# Patient Record
Sex: FEMALE | Race: WHITE | NOT HISPANIC OR LATINO | Employment: UNEMPLOYED | URBAN - METROPOLITAN AREA
[De-identification: names, ages, dates, MRNs, and addresses within clinical notes are randomized per-mention and may not be internally consistent; named-entity substitution may affect disease eponyms.]

---

## 2022-11-12 ENCOUNTER — HOSPITAL ENCOUNTER (EMERGENCY)
Facility: CLINIC | Age: 27
Discharge: HOME | End: 2022-11-12
Admitting: PHYSICIAN ASSISTANT

## 2022-11-12 ENCOUNTER — APPOINTMENT (OUTPATIENT)
Dept: RADIOLOGY | Facility: CLINIC | Age: 27
End: 2022-11-12
Attending: PHYSICIAN ASSISTANT

## 2022-11-12 VITALS
HEART RATE: 96 BPM | BODY MASS INDEX: 22.08 KG/M2 | RESPIRATION RATE: 18 BRPM | SYSTOLIC BLOOD PRESSURE: 112 MMHG | DIASTOLIC BLOOD PRESSURE: 63 MMHG | OXYGEN SATURATION: 100 % | HEIGHT: 62 IN | TEMPERATURE: 98.2 F | WEIGHT: 120 LBS

## 2022-11-12 DIAGNOSIS — M70.21 OLECRANON BURSITIS OF RIGHT ELBOW: ICD-10-CM

## 2022-11-12 PROCEDURE — 73080 X-RAY EXAM OF ELBOW: CPT | Mod: RT

## 2022-11-12 PROCEDURE — 99283 EMERGENCY DEPT VISIT LOW MDM: CPT

## 2022-11-12 ASSESSMENT — ACTIVITIES OF DAILY LIVING (ADL): ADLS_ACUITY_SCORE: 35

## 2022-11-12 NOTE — DISCHARGE INSTRUCTIONS
At this point time I recommend conservative therapy in the form of ibuprofen routinely, ice and Ace wrap.  Please follow-up with your primary care in a couple days if there is continued symptoms.  If you develop fever, worsening pain or expanding redness please return to the ED.

## 2022-11-12 NOTE — ED TRIAGE NOTES
Patient woke this morning with right elbow pain, currently 6/10.      Triage Assessment     Row Name 11/12/22 2744       Triage Assessment (Adult)    Airway WDL WDL       Respiratory WDL    Respiratory WDL WDL       Skin Circulation/Temperature WDL    Skin Circulation/Temperature WDL X       Cardiac WDL    Cardiac WDL WDL       Peripheral/Neurovascular WDL    Peripheral Neurovascular WDL WDL       Cognitive/Neuro/Behavioral WDL    Cognitive/Neuro/Behavioral WDL WDL

## 2022-11-12 NOTE — ED PROVIDER NOTES
EMERGENCY DEPARTMENT ENCOUNTER      NAME: Oxana Harper  AGE: 27 year old female  YOB: 1995  MRN: [id number]  EVALUATION DATE & TIME: 11/12/2022  3:26 PM    PCP: No primary care provider on file.    ED PROVIDER: Pedro Reilly PA-C      Chief Complaint   Patient presents with     Elbow Injury         FINAL IMPRESSION:  1. Olecranon bursitis of right elbow          MEDICAL DECISION MAKING:    Pertinent Labs & Imaging studies reviewed. (See chart for details)  27 year old female presents to the Emergency Department for evaluation of right elbow pain.    After obtaining history present illness, reviewing vitals and examining the patient plan to screen with an x-ray as the patient is unsure whether or not there was actual trauma.  Clinically this appears consistent with an olecranon bursitis.  No fever or chills, no warmth, no redness streaking up or further down the arm.  My suspicion for infection is low.  If x-rays negative clinical plan to treat conservatively with NSAIDs and Ace wrap's and ice is recommended.    X-rays did return normal, no obvious fracture.  Again we will move forward with conservative management stress in signs and symptoms for which to return such as increasing pain, redness that is spreading or development of fever.  Ace wrap was applied prior to discharge.    ED COURSE    I met with the patient, obtained history, performed an initial exam, and discussed options and plan for diagnostics and treatment here in the ED.    At the conclusion of the encounter I discussed the results of all of the tests and the disposition. The questions were answered. The patient or family acknowledged understanding and was agreeable with the care plan.     MEDICATIONS GIVEN IN THE EMERGENCY:  Medications - No data to display    NEW PRESCRIPTIONS STARTED AT TODAY'S ER VISIT  New Prescriptions    No medications on file         "    =================================================================    HPI    Patient information was obtained from: Patient  Oxana Harper is a 27 year old female who presents to this ED for evaluation of evaluation of right elbow swelling and discomfort.  Patient reports that she first noticed the pain and swelling this morning when she awoke.  She does report that her and her family were doing some ranging in her house moving furniture and she may have bumped it in doing so but does not recall any type of severe trauma.  She did take some Tylenol over-the-counter earlier today and it has helped with the pain.  She denies any fever or chills.  She is right-hand dominant.  There is no warmth to touch over the area.  She is never had this problem before and denies previous history of gout.      REVIEW OF SYSTEMS   Review of Systems   All other systems reviewed and are negative.         PAST MEDICAL HISTORY:  No past medical history on file.    PAST SURGICAL HISTORY:  No past surgical history on file.      CURRENT MEDICATIONS:    No current facility-administered medications for this encounter.  No current outpatient medications on file.      ALLERGIES:  Allergies   Allergen Reactions     Codeine Hives     Penicillins Hives       FAMILY HISTORY:  No family history on file.    SOCIAL HISTORY:        VITALS:  Patient Vitals for the past 24 hrs:   BP Temp Temp src Pulse Resp SpO2 Height Weight   11/12/22 1523 112/63 98.2  F (36.8  C) Oral 96 18 100 % 1.575 m (5' 2\") 54.4 kg (120 lb)       PHYSICAL EXAM    Physical Exam  Vitals and nursing note reviewed.   Constitutional:       General: She is not in acute distress.     Appearance: She is normal weight. She is not ill-appearing or toxic-appearing.   HENT:      Head: Normocephalic.      Right Ear: External ear normal.      Left Ear: External ear normal.   Eyes:      Conjunctiva/sclera: Conjunctivae normal.   Cardiovascular:      Rate and Rhythm: Normal rate.      " Pulses: Normal pulses.   Pulmonary:      Effort: Pulmonary effort is normal. No respiratory distress.   Musculoskeletal:      Comments: Examination of the right elbow does reveal a small area of clinically apparent of a clown nose/olecranon bursitis right elbow.  There is no palpable joint effusion of the right elbow.  Patient can supinate and pronate as well as flex and extend without significant discomfort.  There is no warmth.  No erythematous streaking distal or proximal to the elbow.  Remainder musculoskeletal exam is benign.  The bursa itself is not fluctuant.   Skin:     General: Skin is warm and dry.   Neurological:      General: No focal deficit present.      Mental Status: She is alert. Mental status is at baseline.      Sensory: No sensory deficit.      Motor: No weakness.          LAB:  All pertinent labs reviewed and interpreted.  Results for orders placed or performed during the hospital encounter of 11/12/22   Elbow XR, G/E 3 views, right    Impression    IMPRESSION: Mild soft tissue swelling over the olecranon, cannot exclude bursitis or hematoma. No acute fracture or malalignment. There is normal joint spacing. No elbow joint effusion.       RADIOLOGY:  Reviewed all pertinent imaging. Please see official radiology report.  Elbow XR, G/E 3 views, right   Final Result   IMPRESSION: Mild soft tissue swelling over the olecranon, cannot exclude bursitis or hematoma. No acute fracture or malalignment. There is normal joint spacing. No elbow joint effusion.              Pedro Reilly PA-C  Emergency Medicine  [facility]     Pedro Reilly PA-C  11/12/22 6895

## 2022-11-13 ENCOUNTER — APPOINTMENT (OUTPATIENT)
Dept: RADIOLOGY | Facility: HOSPITAL | Age: 27
DRG: 872 | End: 2022-11-13
Attending: INTERNAL MEDICINE

## 2022-11-13 ENCOUNTER — HOSPITAL ENCOUNTER (INPATIENT)
Facility: HOSPITAL | Age: 27
LOS: 3 days | Discharge: HOME | DRG: 872 | End: 2022-11-16
Attending: FAMILY MEDICINE | Admitting: INTERNAL MEDICINE

## 2022-11-13 DIAGNOSIS — M71.10 BURSITIS DUE TO BACTERIAL INFECTION: ICD-10-CM

## 2022-11-13 DIAGNOSIS — B96.89 BURSITIS DUE TO BACTERIAL INFECTION: ICD-10-CM

## 2022-11-13 DIAGNOSIS — M71.121 SEPTIC BURSITIS OF ELBOW, RIGHT: ICD-10-CM

## 2022-11-13 DIAGNOSIS — B37.0 ORAL THRUSH: Primary | ICD-10-CM

## 2022-11-13 DIAGNOSIS — F17.200 TOBACCO USE DISORDER: ICD-10-CM

## 2022-11-13 PROBLEM — A41.9 SEPSIS (H): Status: ACTIVE | Noted: 2022-11-13

## 2022-11-13 LAB
ANION GAP SERPL CALCULATED.3IONS-SCNC: 13 MMOL/L (ref 7–15)
BASOPHILS # BLD AUTO: 0.1 10E3/UL (ref 0–0.2)
BASOPHILS NFR BLD AUTO: 0 %
BUN SERPL-MCNC: 5.7 MG/DL (ref 6–20)
CALCIUM SERPL-MCNC: 9.1 MG/DL (ref 8.6–10)
CHLORIDE SERPL-SCNC: 101 MMOL/L (ref 98–107)
CREAT SERPL-MCNC: 0.59 MG/DL (ref 0.51–0.95)
CRP SERPL-MCNC: 49.7 MG/L
DEPRECATED HCO3 PLAS-SCNC: 21 MMOL/L (ref 22–29)
EOSINOPHIL # BLD AUTO: 0 10E3/UL (ref 0–0.7)
EOSINOPHIL NFR BLD AUTO: 0 %
ERYTHROCYTE [DISTWIDTH] IN BLOOD BY AUTOMATED COUNT: 17.2 % (ref 10–15)
GFR SERPL CREATININE-BSD FRML MDRD: >90 ML/MIN/1.73M2
GLUCOSE SERPL-MCNC: 96 MG/DL (ref 70–99)
HCT VFR BLD AUTO: 35.5 % (ref 35–47)
HGB BLD-MCNC: 12.5 G/DL (ref 11.7–15.7)
IMM GRANULOCYTES # BLD: 0.1 10E3/UL
IMM GRANULOCYTES NFR BLD: 1 %
LACTATE SERPL-SCNC: 1.3 MMOL/L (ref 0.7–2)
LYMPHOCYTES # BLD AUTO: 0.9 10E3/UL (ref 0.8–5.3)
LYMPHOCYTES NFR BLD AUTO: 5 %
MAGNESIUM SERPL-MCNC: 1.5 MG/DL (ref 1.7–2.3)
MAGNESIUM SERPL-MCNC: 2.2 MG/DL (ref 1.7–2.3)
MCH RBC QN AUTO: 35.3 PG (ref 26.5–33)
MCHC RBC AUTO-ENTMCNC: 35.2 G/DL (ref 31.5–36.5)
MCV RBC AUTO: 100 FL (ref 78–100)
MONOCYTES # BLD AUTO: 0.9 10E3/UL (ref 0–1.3)
MONOCYTES NFR BLD AUTO: 5 %
NEUTROPHILS # BLD AUTO: 14.9 10E3/UL (ref 1.6–8.3)
NEUTROPHILS NFR BLD AUTO: 89 %
NRBC # BLD AUTO: 0 10E3/UL
NRBC BLD AUTO-RTO: 0 /100
PLATELET # BLD AUTO: 169 10E3/UL (ref 150–450)
POTASSIUM SERPL-SCNC: 3 MMOL/L (ref 3.4–5.3)
POTASSIUM SERPL-SCNC: 3.4 MMOL/L (ref 3.4–5.3)
POTASSIUM SERPL-SCNC: 3.6 MMOL/L (ref 3.4–5.3)
RBC # BLD AUTO: 3.54 10E6/UL (ref 3.8–5.2)
SARS-COV-2 RNA RESP QL NAA+PROBE: NEGATIVE
SODIUM SERPL-SCNC: 135 MMOL/L (ref 136–145)
WBC # BLD AUTO: 16.8 10E3/UL (ref 4–11)

## 2022-11-13 PROCEDURE — 250N000011 HC RX IP 250 OP 636: Performed by: INTERNAL MEDICINE

## 2022-11-13 PROCEDURE — 83735 ASSAY OF MAGNESIUM: CPT | Performed by: INTERNAL MEDICINE

## 2022-11-13 PROCEDURE — 250N000013 HC RX MED GY IP 250 OP 250 PS 637: Performed by: INTERNAL MEDICINE

## 2022-11-13 PROCEDURE — 84132 ASSAY OF SERUM POTASSIUM: CPT | Performed by: INTERNAL MEDICINE

## 2022-11-13 PROCEDURE — 258N000003 HC RX IP 258 OP 636: Performed by: INTERNAL MEDICINE

## 2022-11-13 PROCEDURE — 36415 COLL VENOUS BLD VENIPUNCTURE: CPT | Performed by: INTERNAL MEDICINE

## 2022-11-13 PROCEDURE — 120N000001 HC R&B MED SURG/OB

## 2022-11-13 PROCEDURE — 83605 ASSAY OF LACTIC ACID: CPT | Performed by: FAMILY MEDICINE

## 2022-11-13 PROCEDURE — 87040 BLOOD CULTURE FOR BACTERIA: CPT | Performed by: FAMILY MEDICINE

## 2022-11-13 PROCEDURE — 99223 1ST HOSP IP/OBS HIGH 75: CPT | Mod: GC | Performed by: INTERNAL MEDICINE

## 2022-11-13 PROCEDURE — 99285 EMERGENCY DEPT VISIT HI MDM: CPT | Mod: 25

## 2022-11-13 PROCEDURE — 82310 ASSAY OF CALCIUM: CPT | Performed by: FAMILY MEDICINE

## 2022-11-13 PROCEDURE — 96374 THER/PROPH/DIAG INJ IV PUSH: CPT

## 2022-11-13 PROCEDURE — U0005 INFEC AGEN DETEC AMPLI PROBE: HCPCS | Performed by: FAMILY MEDICINE

## 2022-11-13 PROCEDURE — 86140 C-REACTIVE PROTEIN: CPT | Performed by: INTERNAL MEDICINE

## 2022-11-13 PROCEDURE — 85025 COMPLETE CBC W/AUTO DIFF WBC: CPT | Performed by: FAMILY MEDICINE

## 2022-11-13 PROCEDURE — 71046 X-RAY EXAM CHEST 2 VIEWS: CPT

## 2022-11-13 PROCEDURE — 36415 COLL VENOUS BLD VENIPUNCTURE: CPT | Performed by: FAMILY MEDICINE

## 2022-11-13 PROCEDURE — C9803 HOPD COVID-19 SPEC COLLECT: HCPCS

## 2022-11-13 PROCEDURE — 250N000011 HC RX IP 250 OP 636: Performed by: FAMILY MEDICINE

## 2022-11-13 RX ORDER — POTASSIUM CHLORIDE 1500 MG/1
40 TABLET, EXTENDED RELEASE ORAL ONCE
Status: COMPLETED | OUTPATIENT
Start: 2022-11-13 | End: 2022-11-13

## 2022-11-13 RX ORDER — OXYCODONE HYDROCHLORIDE 5 MG/1
5 TABLET ORAL EVERY 4 HOURS PRN
Status: DISCONTINUED | OUTPATIENT
Start: 2022-11-13 | End: 2022-11-16 | Stop reason: HOSPADM

## 2022-11-13 RX ORDER — ONDANSETRON 2 MG/ML
4 INJECTION INTRAMUSCULAR; INTRAVENOUS EVERY 6 HOURS PRN
Status: DISCONTINUED | OUTPATIENT
Start: 2022-11-13 | End: 2022-11-16 | Stop reason: HOSPADM

## 2022-11-13 RX ORDER — ACETAMINOPHEN 650 MG/1
650 SUPPOSITORY RECTAL EVERY 6 HOURS PRN
Status: DISCONTINUED | OUTPATIENT
Start: 2022-11-13 | End: 2022-11-16 | Stop reason: HOSPADM

## 2022-11-13 RX ORDER — CALCIUM CARBONATE 500 MG/1
1000 TABLET, CHEWABLE ORAL 4 TIMES DAILY PRN
Status: DISCONTINUED | OUTPATIENT
Start: 2022-11-13 | End: 2022-11-16 | Stop reason: HOSPADM

## 2022-11-13 RX ORDER — VANCOMYCIN HYDROCHLORIDE 1 G/200ML
1000 INJECTION, SOLUTION INTRAVENOUS ONCE
Status: COMPLETED | OUTPATIENT
Start: 2022-11-13 | End: 2022-11-13

## 2022-11-13 RX ORDER — OMEPRAZOLE 20 MG/1
40 TABLET, DELAYED RELEASE ORAL DAILY
Status: DISCONTINUED | OUTPATIENT
Start: 2022-11-13 | End: 2022-11-13 | Stop reason: CLARIF

## 2022-11-13 RX ORDER — MAGNESIUM SULFATE 4 G/50ML
4 INJECTION INTRAVENOUS ONCE
Status: COMPLETED | OUTPATIENT
Start: 2022-11-13 | End: 2022-11-13

## 2022-11-13 RX ORDER — LIDOCAINE 40 MG/G
CREAM TOPICAL
Status: DISCONTINUED | OUTPATIENT
Start: 2022-11-13 | End: 2022-11-16 | Stop reason: HOSPADM

## 2022-11-13 RX ORDER — CALCIUM CARBONATE 500 MG/1
2 TABLET, CHEWABLE ORAL 4 TIMES DAILY PRN
COMMUNITY

## 2022-11-13 RX ORDER — HYDROXYZINE HYDROCHLORIDE 25 MG/1
25 TABLET, FILM COATED ORAL EVERY 6 HOURS PRN
Status: DISCONTINUED | OUTPATIENT
Start: 2022-11-13 | End: 2022-11-16 | Stop reason: HOSPADM

## 2022-11-13 RX ORDER — NALOXONE HYDROCHLORIDE 0.4 MG/ML
0.4 INJECTION, SOLUTION INTRAMUSCULAR; INTRAVENOUS; SUBCUTANEOUS
Status: DISCONTINUED | OUTPATIENT
Start: 2022-11-13 | End: 2022-11-16 | Stop reason: HOSPADM

## 2022-11-13 RX ORDER — HYDROXYZINE HYDROCHLORIDE 25 MG/1
50 TABLET, FILM COATED ORAL EVERY 6 HOURS PRN
Status: DISCONTINUED | OUTPATIENT
Start: 2022-11-13 | End: 2022-11-16 | Stop reason: HOSPADM

## 2022-11-13 RX ORDER — PANTOPRAZOLE SODIUM 40 MG/1
40 TABLET, DELAYED RELEASE ORAL
Status: DISCONTINUED | OUTPATIENT
Start: 2022-11-13 | End: 2022-11-16 | Stop reason: HOSPADM

## 2022-11-13 RX ORDER — ONDANSETRON 4 MG/1
4 TABLET, ORALLY DISINTEGRATING ORAL EVERY 6 HOURS PRN
Status: DISCONTINUED | OUTPATIENT
Start: 2022-11-13 | End: 2022-11-16 | Stop reason: HOSPADM

## 2022-11-13 RX ORDER — VANCOMYCIN HYDROCHLORIDE 1 G/200ML
1000 INJECTION, SOLUTION INTRAVENOUS EVERY 12 HOURS
Status: DISCONTINUED | OUTPATIENT
Start: 2022-11-13 | End: 2022-11-15

## 2022-11-13 RX ORDER — OMEPRAZOLE 20 MG/1
40 TABLET, DELAYED RELEASE ORAL DAILY
COMMUNITY

## 2022-11-13 RX ORDER — NICOTINE 21 MG/24HR
1 PATCH, TRANSDERMAL 24 HOURS TRANSDERMAL DAILY
Status: DISCONTINUED | OUTPATIENT
Start: 2022-11-13 | End: 2022-11-16 | Stop reason: HOSPADM

## 2022-11-13 RX ORDER — ACETAMINOPHEN 325 MG/1
650 TABLET ORAL EVERY 6 HOURS PRN
Status: DISCONTINUED | OUTPATIENT
Start: 2022-11-13 | End: 2022-11-16 | Stop reason: HOSPADM

## 2022-11-13 RX ORDER — CEFEPIME HYDROCHLORIDE 1 G/1
1 INJECTION, POWDER, FOR SOLUTION INTRAMUSCULAR; INTRAVENOUS ONCE
Status: DISCONTINUED | OUTPATIENT
Start: 2022-11-13 | End: 2022-11-13

## 2022-11-13 RX ORDER — NALOXONE HYDROCHLORIDE 0.4 MG/ML
0.2 INJECTION, SOLUTION INTRAMUSCULAR; INTRAVENOUS; SUBCUTANEOUS
Status: DISCONTINUED | OUTPATIENT
Start: 2022-11-13 | End: 2022-11-16 | Stop reason: HOSPADM

## 2022-11-13 RX ORDER — IPRATROPIUM BROMIDE AND ALBUTEROL SULFATE 2.5; .5 MG/3ML; MG/3ML
3 SOLUTION RESPIRATORY (INHALATION) EVERY 4 HOURS PRN
Status: DISCONTINUED | OUTPATIENT
Start: 2022-11-13 | End: 2022-11-16 | Stop reason: HOSPADM

## 2022-11-13 RX ORDER — NAPROXEN SODIUM 220 MG
220 TABLET ORAL 2 TIMES DAILY PRN
COMMUNITY

## 2022-11-13 RX ORDER — ACETAMINOPHEN 325 MG/1
325-650 TABLET ORAL EVERY 6 HOURS PRN
COMMUNITY

## 2022-11-13 RX ORDER — NAPROXEN 250 MG/1
250 TABLET ORAL 2 TIMES DAILY PRN
Status: DISCONTINUED | OUTPATIENT
Start: 2022-11-13 | End: 2022-11-16 | Stop reason: HOSPADM

## 2022-11-13 RX ORDER — POTASSIUM CHLORIDE 1500 MG/1
20 TABLET, EXTENDED RELEASE ORAL ONCE
Status: COMPLETED | OUTPATIENT
Start: 2022-11-13 | End: 2022-11-13

## 2022-11-13 RX ORDER — GUAIFENESIN 600 MG/1
600 TABLET, EXTENDED RELEASE ORAL 2 TIMES DAILY
Status: DISCONTINUED | OUTPATIENT
Start: 2022-11-13 | End: 2022-11-16 | Stop reason: HOSPADM

## 2022-11-13 RX ADMIN — GUAIFENESIN 600 MG: 600 TABLET, EXTENDED RELEASE ORAL at 07:36

## 2022-11-13 RX ADMIN — NAPROXEN 250 MG: 250 TABLET ORAL at 22:45

## 2022-11-13 RX ADMIN — VANCOMYCIN HYDROCHLORIDE 1000 MG: 1 INJECTION, SOLUTION INTRAVENOUS at 06:19

## 2022-11-13 RX ADMIN — ACETAMINOPHEN 650 MG: 325 TABLET, FILM COATED ORAL at 12:29

## 2022-11-13 RX ADMIN — CEFEPIME HYDROCHLORIDE 2 G: 2 INJECTION, POWDER, FOR SOLUTION INTRAVENOUS at 07:52

## 2022-11-13 RX ADMIN — PANTOPRAZOLE SODIUM 40 MG: 40 TABLET, DELAYED RELEASE ORAL at 12:19

## 2022-11-13 RX ADMIN — OXYCODONE HYDROCHLORIDE 5 MG: 5 TABLET ORAL at 15:59

## 2022-11-13 RX ADMIN — POTASSIUM CHLORIDE 40 MEQ: 1500 TABLET, EXTENDED RELEASE ORAL at 15:51

## 2022-11-13 RX ADMIN — CEFEPIME HYDROCHLORIDE 2 G: 2 INJECTION, POWDER, FOR SOLUTION INTRAVENOUS at 22:38

## 2022-11-13 RX ADMIN — MAGNESIUM SULFATE HEPTAHYDRATE 4 G: 80 INJECTION, SOLUTION INTRAVENOUS at 07:39

## 2022-11-13 RX ADMIN — SODIUM CHLORIDE 1566 ML: 9 INJECTION, SOLUTION INTRAVENOUS at 06:46

## 2022-11-13 RX ADMIN — ONDANSETRON 4 MG: 4 TABLET, ORALLY DISINTEGRATING ORAL at 18:09

## 2022-11-13 RX ADMIN — NICOTINE 1 PATCH: 14 PATCH, EXTENDED RELEASE TRANSDERMAL at 07:52

## 2022-11-13 RX ADMIN — VANCOMYCIN HYDROCHLORIDE 1000 MG: 1 INJECTION, SOLUTION INTRAVENOUS at 17:52

## 2022-11-13 RX ADMIN — CEFEPIME HYDROCHLORIDE 2 G: 2 INJECTION, POWDER, FOR SOLUTION INTRAVENOUS at 14:59

## 2022-11-13 RX ADMIN — POTASSIUM CHLORIDE 20 MEQ: 1500 TABLET, EXTENDED RELEASE ORAL at 09:51

## 2022-11-13 RX ADMIN — GUAIFENESIN 600 MG: 600 TABLET, EXTENDED RELEASE ORAL at 20:09

## 2022-11-13 RX ADMIN — POTASSIUM CHLORIDE 40 MEQ: 1500 TABLET, EXTENDED RELEASE ORAL at 07:36

## 2022-11-13 ASSESSMENT — ACTIVITIES OF DAILY LIVING (ADL)
ADLS_ACUITY_SCORE: 35

## 2022-11-13 ASSESSMENT — ENCOUNTER SYMPTOMS: FEVER: 1

## 2022-11-13 NOTE — ED NOTES
"[facility] ED Handoff Report    ED Chief Complaint: Right elbow pain and swelling    ED Diagnosis:  (M71.121) Septic bursitis of elbow, right  Comment:   Plan: Admit       PMH:  History reviewed. No pertinent past medical history.     Code Status:  No Order     Falls Risk: No Band: Not applicable    Current Living Situation/Residence: lives with a significant other     Elimination Status: Continent: Yes     Activity Level: Independent    Patients Preferred Language:  English     Needed: No    Vital Signs:  /60   Pulse 89   Temp 98.1  F (36.7  C) (Oral)   Resp 16   Ht 1.575 m (5' 2\")   Wt 52.2 kg (115 lb)   LMP 10/31/2022   SpO2 99%   BMI 21.03 kg/m       Cardiac Rhythm: n/a    Pain Score: 5/10    Is the Patient Confused:  No    Last Food or Drink: 11/13/22 at 0630    Focused Assessment:  Patient c/o right elbow pain and swelling that started yesterday. No injury noted. Was also seen yesterday at [facility].     Tests Performed: Done: Labs    Treatments Provided:  Currently has Vancomycin and NS bolus running    Family Dynamics/Concerns: No    Family Updated On Visitor Policy: Yes    Plan of Care Communicated to Family: Yes    Who Was Updated about Plan of Care: significant other at bedside    Belongings Checklist Done and Signed by Patient: Yes    Medications sent with patient: n/a    Covid: asymptomatic , pending    Additional Information: Patient has penicillin allergy, pharmacist recommends closely monitoring patient when administering Cefepime for cross reaction. Patient also reports that she is socially anxious and needs to have her partner with her.    RN: Marge Keita RN 11/13/2022 6:35 AM       "

## 2022-11-13 NOTE — ED NOTES
Received call from pharmacist Marium. She reports that patient should be watch closely with Cefepime administration due to potential for cross reactivity. Patient has history of penicillin reaction (hives).

## 2022-11-13 NOTE — ED PROVIDER NOTES
EMERGENCY DEPARTMENT ENCOUNTER      NAME: Oxana Harper  AGE: 27 year old female  YOB: 1995  MRN: [id number]  EVALUATION DATE & TIME: No admission date for patient encounter.    PCP: System, Provider Not In    ED PROVIDER: Danial Yip M.D.    Chief Complaint   Patient presents with     Right arm pain and swelling       FINAL IMPRESSION:  1. Septic bursitis of elbow, right        ED COURSE & MEDICAL DECISION MAKING:    Pertinent Labs & Imaging studies personally reviewed and interpreted by me. (See chart for details)  5:37 AM Patient seen and examined, prior emergency department records reviewed.  Differential diagnosis includes but not limited to sprain, sprain, gout, dislocation, septic arthritis, arthritis.  Patient presents with swelling of the right elbow that started yesterday, was seen at an outside facility and diagnosed with olecranon bursitis.  Since then has developed increased redness and pain as well as fever at home to 1 and 1.7.  On exam here, patient is afebrile.  The right olecranon bursa has surrounding erythema which is well demarcated.  No pain with passive movement of the joint to suggest crystal arthropathy or septic arthritis.  No history of trauma negative x-ray earlier today making fracture or dislocation unlikely.  Symptoms are most consistent with septic bursitis.  Given reported fever as well as tachycardia, labs are ordered along with vancomycin IV.  We did discuss possible aspiration of this bursa, however I do not feel a large fluid collection to be drained and concern for high likelihood of not entering the bursa.  Will defer this for now.  6:17 AM Spoke with Dr. Villagomez, [facility].  Recommends broad-spectrum antibiotics, initially Zosyn and vancomycin but with penicillin allergy will use cefepime.  Recommends against drainage and agrees with plan for admission.  6:32 AM3 Care discussed with Dr. Landon, hospitalist who accepts the patient for admission.     6:54 AM refuses to have compression dressing put on the elbow.  Discussed that this will help with swelling, declines.    At the conclusion of the encounter I discussed the results of all of the tests and the disposition. The questions were answered. The patient or family acknowledged understanding and was agreeable with the care plan.     Medical Decision Making    Supplemental history from: Family Member    External Record(s) Reviewed: Outside ED Record    Differential Diagnosis: See MDM charting for differential considered.     I performed an independent interpretation of the: N/A    Discussed with radiology regarding test interpretation: N/A    Discussion of management with another provider: See ED Course    The following testing was considered but ultimately not selected: None    I considered prescription management with: Antibiotic and Symptomatic Management    The patient's care impacted: None    Consideration of Admission/Observation: N/A - Patient admitted    Care significantly affected by Social Determinants of Health including: N/A    PROCEDURES:   Procedures    MEDICATIONS GIVEN IN THE EMERGENCY:  Medications   vancomycin (VANCOCIN) 1000 mg in dextrose 5% 200 mL PREMIX (1,000 mg Intravenous New Bag 11/13/22 0619)   ceFEPIme (MAXIPIME) 1g vial to attach to  ml bag for ADULTS or NS 50 ml bag for PEDS (has no administration in time range)       NEW PRESCRIPTIONS STARTED AT TODAY'S ER VISIT  New Prescriptions    No medications on file       =================================================================    HPI    Patient information was obtained from: patient       Oxana Harper is a 27 year old female with a pertinent history of right olecranon bursitis, who presents to this ED via walk in for evaluation of increased right elbow swelling and redness.    Patient recently diagnosed yesterday at [facility] ED with right olecranon bursitis to which she was discharged home. This morning, she  "woke up with increased right elbow swelling and redness. There is pain to her right elbow if she straightens her right arm. She also had a measured fever of 101.3. She has tried icing the area and taking Ibuprofen, Naproxen and Tylenol to which did not provide any relief. Last dose of Tylenol was at 1:30AM. No recent injury to the right elbow.  She denies any risk of pregnancy. No other medical complaints at this time.     Chart Review:  11/12/22: [facility] ED: Patient presented with right elbow swelling and discomfort. Elbow XR G/E 3 views right showed Mild soft tissue swelling over the olecranon, cannot exclude bursitis or hematoma. No acute fracture or malalignment. There is normal joint spacing. No elbow joint effusion. Patient diagnosed with olecranon bursitis to her right elbow. Ace wrap was applied prior to discharge and advised to treat with NSAIDS at home.         REVIEW OF SYSTEMS   Review of Systems   Constitutional: Positive for fever.   Musculoskeletal:        Positive right elbow pain, swelling and redness   All other systems reviewed and are negative.     All other systems reviewed and negative    PAST MEDICAL HISTORY:  History reviewed. No pertinent past medical history.    PAST SURGICAL HISTORY:  History reviewed. No pertinent surgical history.    CURRENT MEDICATIONS:    Current Facility-Administered Medications   Medication     ceFEPIme (MAXIPIME) 1g vial to attach to  ml bag for ADULTS or NS 50 ml bag for PEDS     vancomycin (VANCOCIN) 1000 mg in dextrose 5% 200 mL PREMIX     No current outpatient medications on file.       ALLERGIES:  Allergies   Allergen Reactions     Codeine Hives     Penicillins Hives       FAMILY HISTORY:  History reviewed. No pertinent family history.    SOCIAL HISTORY:   Social History     Socioeconomic History     Marital status: Single       VITALS:  BP 97/66   Pulse 88   Temp 98.1  F (36.7  C) (Oral)   Resp 16   Ht 1.575 m (5' 2\")   Wt 52.2 kg (115 lb)   LMP " 10/31/2022   SpO2 99%   BMI 21.03 kg/m      PHYSICAL EXAM:  Physical Exam  Vitals and nursing note reviewed.   Constitutional:       Appearance: Normal appearance.   HENT:      Head: Normocephalic and atraumatic.      Right Ear: External ear normal.      Left Ear: External ear normal.      Nose: Nose normal.      Mouth/Throat:      Mouth: Mucous membranes are moist.   Eyes:      Extraocular Movements: Extraocular movements intact.      Conjunctiva/sclera: Conjunctivae normal.      Pupils: Pupils are equal, round, and reactive to light.   Cardiovascular:      Rate and Rhythm: Regular rhythm. Tachycardia present.   Pulmonary:      Effort: Pulmonary effort is normal.      Breath sounds: Normal breath sounds. No wheezing or rales.   Abdominal:      General: Abdomen is flat. There is no distension.      Palpations: Abdomen is soft.      Tenderness: There is no abdominal tenderness. There is no guarding.   Musculoskeletal:         General: Normal range of motion.      Cervical back: Normal range of motion and neck supple.      Right lower leg: No edema.      Left lower leg: No edema.      Comments: Well demarcated swelling, warmth and redness of right olecranon   Lymphadenopathy:      Cervical: No cervical adenopathy.   Skin:     General: Skin is warm and dry.   Neurological:      General: No focal deficit present.      Mental Status: She is alert and oriented to person, place, and time. Mental status is at baseline.      Comments: No gross focal neurologic deficits   Psychiatric:         Mood and Affect: Mood normal.         Behavior: Behavior normal.         Thought Content: Thought content normal.          LAB:  All pertinent labs reviewed and interpreted.  Results for orders placed or performed during the hospital encounter of 11/13/22   Basic metabolic panel   Result Value Ref Range    Sodium 135 (L) 136 - 145 mmol/L    Potassium 3.0 (L) 3.4 - 5.3 mmol/L    Chloride 101 98 - 107 mmol/L    Carbon Dioxide (CO2) 21  (L) 22 - 29 mmol/L    Anion Gap 13 7 - 15 mmol/L    Urea Nitrogen 5.7 (L) 6.0 - 20.0 mg/dL    Creatinine 0.59 0.51 - 0.95 mg/dL    Calcium 9.1 8.6 - 10.0 mg/dL    Glucose 96 70 - 99 mg/dL    GFR Estimate >90 >60 mL/min/1.73m2   Lactic acid whole blood   Result Value Ref Range    Lactic Acid 1.3 0.7 - 2.0 mmol/L   CBC with platelets and differential   Result Value Ref Range    WBC Count 16.8 (H) 4.0 - 11.0 10e3/uL    RBC Count 3.54 (L) 3.80 - 5.20 10e6/uL    Hemoglobin 12.5 11.7 - 15.7 g/dL    Hematocrit 35.5 35.0 - 47.0 %     78 - 100 fL    MCH 35.3 (H) 26.5 - 33.0 pg    MCHC 35.2 31.5 - 36.5 g/dL    RDW 17.2 (H) 10.0 - 15.0 %    Platelet Count 169 150 - 450 10e3/uL    % Neutrophils 89 %    % Lymphocytes 5 %    % Monocytes 5 %    % Eosinophils 0 %    % Basophils 0 %    % Immature Granulocytes 1 %    NRBCs per 100 WBC 0 <1 /100    Absolute Neutrophils 14.9 (H) 1.6 - 8.3 10e3/uL    Absolute Lymphocytes 0.9 0.8 - 5.3 10e3/uL    Absolute Monocytes 0.9 0.0 - 1.3 10e3/uL    Absolute Eosinophils 0.0 0.0 - 0.7 10e3/uL    Absolute Basophils 0.1 0.0 - 0.2 10e3/uL    Absolute Immature Granulocytes 0.1 <=0.4 10e3/uL    Absolute NRBCs 0.0 10e3/uL       RADIOLOGY:  Reviewed all pertinent imaging. Please see official radiology report.  No orders to display         I, Heidi Irby, am serving as a scribe to document services personally performed by Dr. Yip based on my observation and the provider's statements to me. I, Danial Yip MD attest that Heidi Irby is acting in a scribe capacity, has observed my performance of the services and has documented them in accordance with my direction.    Danial Yip M.D.  Emergency Medicine  [facility] EMERGENCY DEPARTMENT  [facility] [facility] [address]  [phone]  Dept: [phone]     Danial Yip MD  11/13/22 0655

## 2022-11-13 NOTE — PHARMACY-VANCOMYCIN DOSING SERVICE
Pharmacy Vancomycin Initial Note  Date of Service 2022  Patient's  1995  27 year old, female    Indication: olecranon bursitits    Current estimated CrCl = Estimated Creatinine Clearance: 115.9 mL/min (based on SCr of 0.59 mg/dL).    Creatinine for last 3 days  2022:  5:54 AM Creatinine 0.59 mg/dL    Recent Vancomycin Level(s) for last 3 days  No results found for requested labs within last 72 hours.      Vancomycin IV Administrations (past 72 hours)                   vancomycin (VANCOCIN) 1000 mg in dextrose 5% 200 mL PREMIX (mg) 1,000 mg New Bag 22 0619                Nephrotoxins and other renal medications (From now, onward)    Start     Dose/Rate Route Frequency Ordered Stop    22 1800  vancomycin (VANCOCIN) 1000 mg in dextrose 5% 200 mL PREMIX         1,000 mg  200 mL/hr over 1 Hours Intravenous EVERY 12 HOURS 22 0917            Contrast Orders - past 72 hours (72h ago, onward)    None          [employer] Prediction of Planned Initial Vancomycin Regimen  Regimen: 1000 mg IV every 12 hours.  Start time: 10:09 on 2022  Exposure target: AUC24 (range)400-600 mg/L.hr   AUC24,ss: 455 mg/L.hr  Probability of AUC24 > 400: 63 %  Ctrough,ss: 12.3 mg/L  Probability of Ctrough,ss > 20: 18 %  Probability of nephrotoxicity (Lodise DENISE ): 8 %          Plan:  1. Start vancomycin  1000 mg IV q12h.   2. Vancomycin monitoring method: AUC  3. Vancomycin therapeutic monitoring goal: 400-600 mg*h/L  4. Pharmacy will check vancomycin levels as appropriate in 1-3 Days.    5. Serum creatinine levels will be ordered daily x 3 days, then reassess ongoing plan.      Fiona Araya [facility]

## 2022-11-13 NOTE — H&P
"[facility]    History and Physical - Hospitalist Service       Date of Admission:  11/13/2022    Assessment & Plan      Oxana Harper is a 27 year old female previously healthy admitted on 11/13/2022.     #Right elbow olecranon bursitis.  No trauma or repetitive use.  No recent febrile illness.  X-ray without elbow joint effusion.  #Sepsis, T-max one 101.3/at home, WBC 16.8, BP 90/60, lactate 1.3.  CRP 50.  -30 mL/KG NS bolus  -Vancomycin/cefepime  -Orthopedics evaluation; ED provider contacted on-call physician, recommended IV ABx and no immediate surgical plans  -Symptomatic treatment with analgesics, antipyretics.  Noted allergy to opiates-reportedly hives to codeine and cough syrup.    Anxious mood, due to acute illness and hospitalization.  Reportedly \"I am doing well when being alone\".  No known history of panic attacks.  -As needed Vistaril    Hypokalemia at 3.0.  -Check magnesium  -Magnesium and potassium replacement per protocols    Acute bronchitis without bronchospasm.  Viral vs bacterial.  Already on antibiotics as above.  COVID-19 negative.  -Guaifenesin twice daily  -As needed albuterol  -Smoking cessation  -Obtain chest x-ray    Tobacco use disorder.  Patient smokes cigarettes.  She was counseled on smoking cessation.    Nicotine replacement-patch.       Diet: Combination Diet Regular Diet Adult    DVT Prophylaxis: Low Risk/Ambulatory with no VTE prophylaxis indicated  Zepeda Catheter: Not present  Central Lines: None  Cardiac Monitoring: None  Code Status: Full Code      Clinically Significant Risk Factors Present on Admission     # Hypokalemia: Lowest K = 3 mmol/L (Ref range: 3.4-5.3) in last 2 days, will replace as needed  # Hyponatremia: Lowest Na = 135 mmol/L (Ref range: 136-145) in last 2 days, will monitor as appropriate                    Disposition Plan      Expected Discharge Date: 11/15/2022                The patient's care was discussed with the Bedside " Nurse, Patient and Patient's friend at bedside in ER.    Adelaida Landon MD  Hospitalist Service  [facility]  Securely message with the [employer] Web Console (learn more here)  Text page via [employer] Paging/Directory   _____________________________________________________________________    Chief Complaint   Right elbow redness, edema, pain, fever, cough    History is obtained from the patient, electronic health record and emergency department physician    History of Present Illness   Oxana Harper is a 27 year old female previously healthy not on any prescription or over-the-counter medications, who recently on October 1 relocated from [place], presents to ED with above-mentioned complaints.  Onset of right elbow swelling and redness in 11/12 at 11 AM, upon awakening.  Throughout the day redness and pain worsened, prompting evaluation at [facility] ED.  He therapy and allergies were recommended the patient was discharged home.  He has symptoms worsened overnight thus she came to our ER.  She is reporting fever 101.3, increased pain in right elbow, decreased range of motion.  Patient denies preceding trauma or infection.  She has dry cough, attributing to a viral infection going around household of 8 people.  She smokes.  Patient denies right arm weakness, tingling, numbness.  No red streaking proximally or distally from olecranon.  She denies chest pain, cardiac limitations, abdominal pain, nausea, dysuria.  No previous musculoskeletal illnesses.    Review of Systems    The 10 point Review of Systems is negative other than noted in the HPI.    Past Medical History    Tobacco use disorder.    Past Surgical History   No previous surgeries.    Social History   Patient relocated to [place] from [place] on October 1, 2022.  She smokes cigarettes.  Denies recreational drugs or daily alcohol use.       Family History   Family history reviewed and not pertinent to chief complaint.    Prior to  Admission Medications   None     Allergies   Allergies   Allergen Reactions     Codeine Hives     Penicillins Hives       Physical Exam   Vital Signs: Temp: 98.1  F (36.7  C) Temp src: Oral BP: 110/60 Pulse: 89   Resp: 16 SpO2: 99 % O2 Device: None (Room air)    Weight: 115 lbs 0 oz  General: Alert and oriented x 3. Not in obvious distress.  HEENT: NC, AT. Neck- supple, No JVP elevation, lymphadenopathy or thyromegaly. Trachea-central.  Chest: Clear to auscultation bilaterally.  Heart: S1S2 regular. No M/R/G.  Abdomen: Soft. NT, ND. No organomegaly. Bowel sounds- active.  Back: No spine tenderness. No CVA tenderness.  Extremities: No leg swelling. Peripheral pulses 2+ bilaterally.  Neuro: Cranial nerves 1-12 grossly normal. No focal neurological deficit    Data   Data reviewed today: I reviewed all medications, new labs and imaging results over the last 24 hours. I personally reviewed.    Recent Labs   Lab 11/13/22  0554   WBC 16.8*   HGB 12.5         *   POTASSIUM 3.0*   CHLORIDE 101   CO2 21*   BUN 5.7*   CR 0.59   ANIONGAP 13   PRETTY 9.1   GLC 96     16.8 (H)    \    12.5    /    169   N 89    L N/A    135 (L)    101    5.7 (L) /   ------------------------------------ 96   ALT N/A   AST N/A   AP N/A   ALB N/A   Ca 9.1  3.0 (L)    21 (L)    0.59 \    % RETIC N/A    LDH N/A  Troponin N/A    BNP N/A    CK N/A  INR N/A   PTT N/A    D-dimer N/A    Fibrinogen N/A    Antithrombin N/A  Ferritin N/A  CRP 49.70 (H)    IL-6 N/A  Recent Results (from the past 24 hour(s))   Elbow XR, G/E 3 views, right    Narrative    EXAM: XR ELBOW RIGHT G/E 3 VIEWS  LOCATION: [facility]  DATE/TIME: 11/12/2022 4:19 PM    INDICATION: possible trauma to right elbow, pain  COMPARISON: None.      Impression    IMPRESSION: Mild soft tissue swelling over the olecranon, cannot exclude bursitis or hematoma. No acute fracture or malalignment. There is normal joint spacing. No elbow joint effusion.

## 2022-11-13 NOTE — CONSULTS
ORTHOPEDIC CONSULTATION    CHIEF COMPLAINT: Right elbow pain and redness      HISTORY OF PRESENT ILLNESS:  The patient is seen in orthopedic consultation at the request of Edmund Oh MD.      The patient is a 27 year old female with 1-day history of right elbow pain and redness.  She noticed this yesterday when she was rearranging her furniture with her family.  She might of bumped the elbow but did not have any traumatic wounds.  She was seen at [facility] emergency department yesterday where x-rays were obtained and unremarkable.  She had progressive pain and redness throughout the day yesterday and presented to the emergency department at [facility] this morning.  She endorsed a fever of 101 at home.  She denies any numbness or tingling.  No history of gout.  No other signs of other systemic illness.    Since admission, patient has been afebrile.    PAST MEDICAL HISTORY:   History reviewed. No pertinent past medical history.     ALLERGIES:   Codeine and Penicillins    MEDICATIONS ON ADMISSION:  Medications were reviewed.  They do include:   Medications Prior to Admission   Medication Sig Dispense Refill Last Dose     acetaminophen (TYLENOL) 325 MG tablet Take 325-650 mg by mouth every 6 hours as needed   11/12/2022     aspirin-acetaminophen-caffeine (EXCEDRIN MIGRAINE) 250-250-65 MG tablet Take 1 tablet by mouth daily as needed for headaches   Past Week     calcium carbonate (TUMS) 500 MG chewable tablet Take 2 chew tab by mouth 4 times daily as needed for heartburn   11/12/2022     dextromethorphan (TUSSIN COUGH) 15 MG/5ML syrup Take 10 mLs by mouth 4 times daily as needed for cough   11/12/2022     naproxen sodium (ANAPROX) 220 MG tablet Take 220 mg by mouth 2 times daily as needed   11/12/2022     omeprazole (PRILOSEC OTC) 20 MG EC tablet Take 40 mg by mouth daily   Past Week       SOCIAL HISTORY:   Social History     Socioeconomic History     Marital status: Single     Spouse name: Not on file      Number of children: Not on file     Years of education: Not on file     Highest education level: Not on file   Occupational History     Not on file   Tobacco Use     Smoking status: Not on file     Smokeless tobacco: Not on file   Substance and Sexual Activity     Alcohol use: Not on file     Drug use: Not on file     Sexual activity: Not on file   Other Topics Concern     Not on file   Social History Narrative     Not on file     Social Determinants of Health     Financial Resource Strain: Not on file   Food Insecurity: Not on file   Transportation Needs: Not on file   Physical Activity: Not on file   Stress: Not on file   Social Connections: Not on file   Intimate Partner Violence: Not on file   Housing Stability: Not on file         FAMILY HISTORY:  History reviewed. No pertinent family history.    REVIEW OF SYSTEMS:   See Admission History and Physical     PHYSICAL EXAMINATION:  Temp: 98.2  F (36.8  C) Temp src: Oral BP: 102/55 Pulse: 100   Resp: 20 SpO2: 98 % O2 Device: None (Room air)     General: On examination, the patient is AOx3.  Resting comfortably.     Erythema over the posterior aspect of the elbow.  Nontender along joint line.  Patient tolerates active and passive elbow range of motion with pain at the end range of motion.  Range of motion , limited by swelling and pain.  No fluctuance or evidence of abscess.  No evidence of large olecranon bursa.  Fires EPL, FPL, intrinsics.  Sensation intact in median, radial, ulnar nerve distribution.  Fingers warm and well-perfused.      RADIOGRAPHIC EVALUATION:   3 views right elbow obtained 11/12/2022 are reviewed and are unremarkable for any fracture or dislocation.  Evidence of posterior elbow soft tissue swelling.        LABORATORY DATA:     Recent Labs   Lab Test 11/13/22  0554   HGB 12.5        CRP 49.7  WBC 16.8    IMPRESSION:   27-year-old female with right posterior elbow cellulitis versus small olecranon bursitis.       PLAN:  -Broad-spectrum IV antibiotics   -No surgical intervention at this time  -Monitor clinical exam and labs    Malena Villagomez MD  10:44 AM  11/13/2022

## 2022-11-13 NOTE — ED NOTES
Cefepime abx infusing, pt monitored for cross-allergy/sensitivity pt tolerating it. pt medicated per mag  and K+ protocol- see labs for recheck.

## 2022-11-13 NOTE — CONSULTS
Care Management Initial Consult    General Information  Assessment completed with: Patient,         Primary Care Provider verified and updated as needed:     Readmission within the last 30 days:           Advance Care Planning:            Communication Assessment  Patient's communication style: spoken language (English or Bilingual)             Cognitive  Cognitive/Neuro/Behavioral: WDL                      Living Environment:   People in home: friend(s)     Current living Arrangements:        Able to return to prior arrangements: yes       Family/Social Support:  Care provided by:    Provides care for:    Marital Status: Single             Description of Support System:           Current Resources:   Patient receiving home care services: No     Community Resources:    Equipment currently used at home:    Supplies currently used at home:      Employment/Financial:  Employment Status: other (see comments) (planned on starting a job in [employer] at [employer] 11/14)        Financial Concerns:     Referral to Financial Worker: Yes       Lifestyle & Psychosocial Needs:  Social Determinants of Health     Tobacco Use: Not on file   Alcohol Use: Not on file   Financial Resource Strain: Not on file   Food Insecurity: Not on file   Transportation Needs: Not on file   Physical Activity: Not on file   Stress: Not on file   Social Connections: Not on file   Intimate Partner Violence: Not on file   Depression: Not on file   Housing Stability: Not on file       Functional Status:  Prior to admission patient needed assistance:              Mental Health Status:  Mental Health Status: No Current Concerns       Chemical Dependency Status:                Values/Beliefs:  Spiritual, Cultural Beliefs, Gnosticism Practices, Values that affect care:                 Additional Information:  Patient is alert.oriented and independent with ADLs. She expects to discharge home with a friend to transport. Referral made to Financial SW re: no  insurance. Patient planned on starting a [employer] job with [employer] 11/14.    MAGGI Castrejon

## 2022-11-13 NOTE — ED NOTES
MD wanting pressure dressing to pts elbow.  Pt not wanting to be in pain and not wanting any pressure on the elbow.   MD aware

## 2022-11-13 NOTE — Clinical Note
Oxana Harper was seen and treated in our emergency department on 11/13/2022.    Patient admitted to hospital on 11/13/2022     Sincerely,     [facility] Emergency Department

## 2022-11-13 NOTE — ED TRIAGE NOTES
Pt came in to the ER with significant other for c/o right elbow swelling increasing. The redness and swelling to her elbow started yesterday at 1100, pt went to [facility], xray was done, no fracture. Pt was diagnosed with bursitis and discharged. This morning when she woke up the swelling has increased up her arm. Last took naproxen at 0130 and tylenol 2300.      Triage Assessment     Row Name 11/13/22 0523       Respiratory WDL    Respiratory WDL WDL

## 2022-11-13 NOTE — PROGRESS NOTES
Patient seen by Dr. Landon with this morning.  Chart reviewed.  Chest x-ray does not show any infiltrate.  Continue as needed DuoNebs for bronchitis.  Continue IV vancomycin.  Appreciate Ortho input.  Order CRP for next a.m. order oxycodone as needed for severe pain

## 2022-11-13 NOTE — PHARMACY-ADMISSION MEDICATION HISTORY
Pharmacy Note - Admission Medication History    Pertinent Provider Information: none     ______________________________________________________________________    Prior To Admission (PTA) med list completed and updated in EMR.       PTA Med List   Medication Sig Last Dose     acetaminophen (TYLENOL) 325 MG tablet Take 325-650 mg by mouth every 6 hours as needed 11/12/2022     aspirin-acetaminophen-caffeine (EXCEDRIN MIGRAINE) 250-250-65 MG tablet Take 1 tablet by mouth daily as needed for headaches Past Week     calcium carbonate (TUMS) 500 MG chewable tablet Take 2 chew tab by mouth 4 times daily as needed for heartburn 11/12/2022     dextromethorphan (TUSSIN COUGH) 15 MG/5ML syrup Take 10 mLs by mouth 4 times daily as needed for cough 11/12/2022     naproxen sodium (ANAPROX) 220 MG tablet Take 220 mg by mouth 2 times daily as needed 11/12/2022     omeprazole (PRILOSEC OTC) 20 MG EC tablet Take 40 mg by mouth daily Past Week       Information source(s): Patient, Family member and [facility] [place]  Method of interview communication: in-person    Summary of Changes to PTA Med List  New: all  Discontinued: none  Changed: none    Patient was asked about OTC/herbal products specifically.  PTA med list reflects this.    In the past week, patient estimated taking medication this percent of the time:  50-90% due to illness.    Allergies were reviewed, assessed, and updated with the patient.      Patient does not use any multi-dose medications prior to admission.    The information provided in this note is only as accurate as the sources available at the time of the update(s).    Thank you for the opportunity to participate in the care of this patient.    Kat Jasso  11/13/2022 7:28 AM

## 2022-11-14 LAB
ANION GAP SERPL CALCULATED.3IONS-SCNC: 8 MMOL/L (ref 7–15)
BASOPHILS # BLD AUTO: 0 10E3/UL (ref 0–0.2)
BASOPHILS NFR BLD AUTO: 0 %
BUN SERPL-MCNC: 5.8 MG/DL (ref 6–20)
CALCIUM SERPL-MCNC: 8.5 MG/DL (ref 8.6–10)
CHLORIDE SERPL-SCNC: 105 MMOL/L (ref 98–107)
CREAT SERPL-MCNC: 0.52 MG/DL (ref 0.51–0.95)
CRP SERPL-MCNC: 170.2 MG/L
DEPRECATED HCO3 PLAS-SCNC: 19 MMOL/L (ref 22–29)
EOSINOPHIL # BLD AUTO: 0.2 10E3/UL (ref 0–0.7)
EOSINOPHIL NFR BLD AUTO: 1 %
ERYTHROCYTE [DISTWIDTH] IN BLOOD BY AUTOMATED COUNT: 17.4 % (ref 10–15)
GFR SERPL CREATININE-BSD FRML MDRD: >90 ML/MIN/1.73M2
GLUCOSE SERPL-MCNC: 93 MG/DL (ref 70–99)
HCT VFR BLD AUTO: 32.7 % (ref 35–47)
HGB BLD-MCNC: 11.4 G/DL (ref 11.7–15.7)
IMM GRANULOCYTES # BLD: 0.1 10E3/UL
IMM GRANULOCYTES NFR BLD: 1 %
LYMPHOCYTES # BLD AUTO: 0.9 10E3/UL (ref 0.8–5.3)
LYMPHOCYTES NFR BLD AUTO: 6 %
MAGNESIUM SERPL-MCNC: 1.7 MG/DL (ref 1.7–2.3)
MCH RBC QN AUTO: 35.2 PG (ref 26.5–33)
MCHC RBC AUTO-ENTMCNC: 34.9 G/DL (ref 31.5–36.5)
MCV RBC AUTO: 101 FL (ref 78–100)
MONOCYTES # BLD AUTO: 0.3 10E3/UL (ref 0–1.3)
MONOCYTES NFR BLD AUTO: 2 %
NEUTROPHILS # BLD AUTO: 12.2 10E3/UL (ref 1.6–8.3)
NEUTROPHILS NFR BLD AUTO: 90 %
NRBC # BLD AUTO: 0 10E3/UL
NRBC BLD AUTO-RTO: 0 /100
PLATELET # BLD AUTO: 147 10E3/UL (ref 150–450)
POTASSIUM SERPL-SCNC: 3.7 MMOL/L (ref 3.4–5.3)
RBC # BLD AUTO: 3.24 10E6/UL (ref 3.8–5.2)
SODIUM SERPL-SCNC: 132 MMOL/L (ref 136–145)
WBC # BLD AUTO: 13.6 10E3/UL (ref 4–11)

## 2022-11-14 PROCEDURE — 86140 C-REACTIVE PROTEIN: CPT | Performed by: INTERNAL MEDICINE

## 2022-11-14 PROCEDURE — 250N000011 HC RX IP 250 OP 636: Performed by: INTERNAL MEDICINE

## 2022-11-14 PROCEDURE — 258N000003 HC RX IP 258 OP 636: Performed by: INTERNAL MEDICINE

## 2022-11-14 PROCEDURE — 83735 ASSAY OF MAGNESIUM: CPT | Performed by: INTERNAL MEDICINE

## 2022-11-14 PROCEDURE — 250N000013 HC RX MED GY IP 250 OP 250 PS 637: Performed by: INTERNAL MEDICINE

## 2022-11-14 PROCEDURE — 99233 SBSQ HOSP IP/OBS HIGH 50: CPT | Performed by: INTERNAL MEDICINE

## 2022-11-14 PROCEDURE — 120N000001 HC R&B MED SURG/OB

## 2022-11-14 PROCEDURE — 80048 BASIC METABOLIC PNL TOTAL CA: CPT | Performed by: INTERNAL MEDICINE

## 2022-11-14 PROCEDURE — 85025 COMPLETE CBC W/AUTO DIFF WBC: CPT | Performed by: INTERNAL MEDICINE

## 2022-11-14 PROCEDURE — 36415 COLL VENOUS BLD VENIPUNCTURE: CPT | Performed by: INTERNAL MEDICINE

## 2022-11-14 RX ORDER — CEFTRIAXONE 2 G/1
2 INJECTION, POWDER, FOR SOLUTION INTRAMUSCULAR; INTRAVENOUS EVERY 24 HOURS
Status: DISCONTINUED | OUTPATIENT
Start: 2022-11-14 | End: 2022-11-16 | Stop reason: HOSPADM

## 2022-11-14 RX ORDER — NYSTATIN 100000/ML
500000 SUSPENSION, ORAL (FINAL DOSE FORM) ORAL 4 TIMES DAILY
Status: DISCONTINUED | OUTPATIENT
Start: 2022-11-14 | End: 2022-11-16 | Stop reason: HOSPADM

## 2022-11-14 RX ADMIN — NICOTINE 1 PATCH: 14 PATCH, EXTENDED RELEASE TRANSDERMAL at 09:18

## 2022-11-14 RX ADMIN — CEFEPIME HYDROCHLORIDE 2 G: 2 INJECTION, POWDER, FOR SOLUTION INTRAVENOUS at 09:02

## 2022-11-14 RX ADMIN — GUAIFENESIN 600 MG: 600 TABLET, EXTENDED RELEASE ORAL at 10:00

## 2022-11-14 RX ADMIN — VANCOMYCIN HYDROCHLORIDE 1000 MG: 1 INJECTION, SOLUTION INTRAVENOUS at 18:14

## 2022-11-14 RX ADMIN — CEFTRIAXONE SODIUM 2 G: 2 INJECTION, POWDER, FOR SOLUTION INTRAMUSCULAR; INTRAVENOUS at 13:56

## 2022-11-14 RX ADMIN — GUAIFENESIN 600 MG: 600 TABLET, EXTENDED RELEASE ORAL at 20:07

## 2022-11-14 RX ADMIN — VANCOMYCIN HYDROCHLORIDE 1000 MG: 1 INJECTION, SOLUTION INTRAVENOUS at 06:45

## 2022-11-14 RX ADMIN — NYSTATIN 500000 UNITS: 100000 SUSPENSION ORAL at 16:40

## 2022-11-14 RX ADMIN — ACETAMINOPHEN, ASPIRIN, CAFFEINE 1 TABLET: 250; 65; 250 TABLET, FILM COATED ORAL at 10:01

## 2022-11-14 RX ADMIN — ACETAMINOPHEN 650 MG: 325 TABLET, FILM COATED ORAL at 21:35

## 2022-11-14 RX ADMIN — NYSTATIN 500000 UNITS: 100000 SUSPENSION ORAL at 20:07

## 2022-11-14 RX ADMIN — SODIUM CHLORIDE 1000 ML: 9 INJECTION, SOLUTION INTRAVENOUS at 12:52

## 2022-11-14 RX ADMIN — HYDROXYZINE HYDROCHLORIDE 50 MG: 25 TABLET, FILM COATED ORAL at 23:45

## 2022-11-14 RX ADMIN — PANTOPRAZOLE SODIUM 40 MG: 40 TABLET, DELAYED RELEASE ORAL at 06:45

## 2022-11-14 RX ADMIN — OXYCODONE HYDROCHLORIDE 5 MG: 5 TABLET ORAL at 21:35

## 2022-11-14 ASSESSMENT — ACTIVITIES OF DAILY LIVING (ADL)
ADLS_ACUITY_SCORE: 35
DOING_ERRANDS_INDEPENDENTLY_DIFFICULTY: NO
ADLS_ACUITY_SCORE: 18
ADLS_ACUITY_SCORE: 18
ADLS_ACUITY_SCORE: 35
DIFFICULTY_EATING/SWALLOWING: NO
ADLS_ACUITY_SCORE: 35
CHANGE_IN_FUNCTIONAL_STATUS_SINCE_ONSET_OF_CURRENT_ILLNESS/INJURY: NO
ADLS_ACUITY_SCORE: 18
ADLS_ACUITY_SCORE: 35
CONCENTRATING,_REMEMBERING_OR_MAKING_DECISIONS_DIFFICULTY: NO
ADLS_ACUITY_SCORE: 35
WALKING_OR_CLIMBING_STAIRS_DIFFICULTY: NO
DRESSING/BATHING_DIFFICULTY: NO
FALL_HISTORY_WITHIN_LAST_SIX_MONTHS: NO
TOILETING_ISSUES: NO
WEAR_GLASSES_OR_BLIND: NO
ADLS_ACUITY_SCORE: 18
ADLS_ACUITY_SCORE: 35

## 2022-11-14 NOTE — PROGRESS NOTES
"Orthopedic Progress Note      Assessment:    Right posterior elbow cellulitis versus small olecranon bursitis.     Plan:   - Continue broad-spectrum IV antibiotics  - No surgical intervention at this time  - Follow inflammatory markers and cultures  - Pain Management per Medicine  - WBAT RUE  - Conservative treatment: Ice, compressed, rest.  - Recommend an MRI, if there is increase pain or new pain with elbow ROM.  - Ortho will continue to follow patient      Subjective:  Pain: mild  Neuro:  Patient denies new onset numbness or paresthesias    Patient reports right elbow pain gradually improving since starting IV antibiotics. Patient reports new redness over the marked line likely from itching her elbow  while asleep this morning. Patient reports low grade fever however no chills.     Objective:  BP 98/58 (BP Location: Left arm)   Pulse 101   Temp 98.2  F (36.8  C) (Oral)   Resp 18   Ht 1.575 m (5' 2\")   Wt 52.2 kg (115 lb)   LMP 10/31/2022   SpO2 99%   BMI 21.03 kg/m    The patient is alert and awake. Patient is sitting up in bed and appears comfortable.   Erythema over the posterior aspect of the elbow. New increased erythema exceeding the marked line on 11/13. Nontender along joint line. Patient tolerates active and passive elbow range of motion with pain at the end range of motion. Range of motion , limited by swelling and pain.  No fluctuance or evidence of abscess.  No evidence of large olecranon bursa.  Fires EPL, FPL, intrinsics.  Sensation intact in median, radial, ulnar nerve distribution.  Fingers warm and well-perfused. Radial pulse intact.     Pertinent Labs   Lab Results: personally reviewed.   No results found for: INR, PROTIME  Lab Results   Component Value Date    WBC 13.6 (H) 11/14/2022    HGB 11.4 (L) 11/14/2022    HCT 32.7 (L) 11/14/2022     (H) 11/14/2022     (L) 11/14/2022     Lab Results   Component Value Date     (L) 11/14/2022    CO2 19 (L) 11/14/2022 "         Report completed by:  Phani Irby PA-C, ANNABEL  Date: 11/14/2022  Time: 8:58 AM

## 2022-11-14 NOTE — PROGRESS NOTES
"Progress Note    Assessment/Plan  27-year-old current smoker who presented with right elbow swelling and pain and was diagnosed with right elbow cellulitis associated with olecranon bursitis    Sepsis secondary to right elbow cellulitis and olecranon bursitis.  --Patient is clinically improving and WBC trending down.  --Currently on cefepime and vancomycin.  Will de-escalate to Rocephin and vancomycin given low suspicion for Pseudomonas infection.  Likely causes Streptococcus, staph but cannot rule out MRSA  --May need an MRI as per orthopedic if patient has worsening pain and fever  --Continue symptomatic treatment with analgesics antipyretics.  Ordered as needed oxycodone for pain control.  Monitor for hives    Anxiety  --Improved.    Hypokalemia and hypomagnesemia  --Resolved    Coughing likely due to bronchitis from smoking  --Chest x-ray on admission was unremarkable    Hyponatremia likely due to decreased fluid loss  --Patient had episode of hypotension with blood pressures in the 80s this morning.    --order normal saline bolus 1000 cc.  Repeat BMP next a.m.    Current smoker  -- Patient counseled to quit smoking  -- Currently on nicotine patch    Barriers to discharge:    Anticipated discharge date:        Subjective  Patient feels better.  T-max of 99.9.  WBC is trending down.  CRP is trending up.  Sodium is slightly low at 132.  Potassium and magnesium corrected.  Patient denies any shortness of breath or coughing.  BP was borderline low at 86/53.  Review further 10 system is negative.  Objective    BP 98/58 (BP Location: Left arm)   Pulse 101   Temp 98.2  F (36.8  C) (Oral)   Resp 18   Ht 1.575 m (5' 2\")   Wt 52.2 kg (115 lb)   LMP 10/31/2022   SpO2 99%   BMI 21.03 kg/m    Weight:   Wt Readings from Last 5 Encounters:   11/13/22 52.2 kg (115 lb)   11/12/22 54.4 kg (120 lb)       I/O last 3 completed shifts:  In: 240 [P.O.:240]  Out: -   I/O this shift:  In: 341 [I.V.:341]  Out: - "           Physical Exam  Alert, oriented*3  No pallor, icterus, clubbing, cyanosis  Body mass index is 21.03 kg/m .  Right elbow is swollen and tender to touch  No sinus tenderness  Moist membranes  Neck supple  CVS: S1 S2-N, no murmurs, gallops, rubs  Resp: B/L vesicular breath sounds, no wheezing, crackles  Abd: soft, No t/g/r  Neuro: no involuntary movements such as tremors  Vasc: no leg edema     Pertinent Labs  ----------------------  Recent Labs   Lab 11/14/22  0536 11/13/22 2014 11/13/22  1402 11/13/22  0554   *  --   --  135*   POTASSIUM 3.7 3.6 3.4 3.0*   CO2 19*  --   --  21*   BUN 5.8*  --   --  5.7*   CR 0.52  --   --  0.59   MAG 1.7  --  2.2 1.5*   GLC 93  --   --  96     Recent Labs   Lab 11/14/22  0536 11/13/22  0554   WBC 13.6* 16.8*   HGB 11.4* 12.5   HCT 32.7* 35.5   * 169     No results for input(s): INR in the last 168 hours.  No flowsheet data found.      Pertinent Radiology   Radiology Results: Personally reviewed impression/s  XR Chest 2 Views    Result Date: 11/13/2022  EXAM: XR CHEST 2 VIEWS LOCATION: [facility] DATE/TIME: 11/13/2022, 8:26 AM INDICATION: Cough. COMPARISON: None available.     IMPRESSION: Two views of the chest were obtained. Cardiomediastinal silhouette is within normal limits. No suspicious focal pulmonary opacities. No significant pleural effusion or pneumothorax.     Elbow XR, G/E 3 views, right    Result Date: 11/12/2022  EXAM: XR ELBOW RIGHT G/E 3 VIEWS LOCATION: [facility] DATE/TIME: 11/12/2022 4:19 PM INDICATION: possible trauma to right elbow, pain COMPARISON: None.     IMPRESSION: Mild soft tissue swelling over the olecranon, cannot exclude bursitis or hematoma. No acute fracture or malalignment. There is normal joint spacing. No elbow joint effusion.    EKG Results: not reviewed.

## 2022-11-15 LAB
ANION GAP SERPL CALCULATED.3IONS-SCNC: 11 MMOL/L (ref 7–15)
BUN SERPL-MCNC: 3.3 MG/DL (ref 6–20)
CALCIUM SERPL-MCNC: 8.3 MG/DL (ref 8.6–10)
CHLORIDE SERPL-SCNC: 109 MMOL/L (ref 98–107)
CREAT SERPL-MCNC: 0.51 MG/DL (ref 0.51–0.95)
CRP SERPL-MCNC: 131.7 MG/L
DEPRECATED HCO3 PLAS-SCNC: 20 MMOL/L (ref 22–29)
ERYTHROCYTE [DISTWIDTH] IN BLOOD BY AUTOMATED COUNT: 17.7 % (ref 10–15)
GFR SERPL CREATININE-BSD FRML MDRD: >90 ML/MIN/1.73M2
GLUCOSE SERPL-MCNC: 109 MG/DL (ref 70–99)
HCT VFR BLD AUTO: 30.1 % (ref 35–47)
HGB BLD-MCNC: 10.7 G/DL (ref 11.7–15.7)
MAGNESIUM SERPL-MCNC: 1.7 MG/DL (ref 1.7–2.3)
MCH RBC QN AUTO: 35.7 PG (ref 26.5–33)
MCHC RBC AUTO-ENTMCNC: 35.5 G/DL (ref 31.5–36.5)
MCV RBC AUTO: 100 FL (ref 78–100)
PLATELET # BLD AUTO: 152 10E3/UL (ref 150–450)
POTASSIUM SERPL-SCNC: 3.6 MMOL/L (ref 3.4–5.3)
RBC # BLD AUTO: 3 10E6/UL (ref 3.8–5.2)
SODIUM SERPL-SCNC: 140 MMOL/L (ref 136–145)
VANCOMYCIN SERPL-MCNC: 6.3 UG/ML
WBC # BLD AUTO: 11.6 10E3/UL (ref 4–11)

## 2022-11-15 PROCEDURE — 250N000011 HC RX IP 250 OP 636: Performed by: INTERNAL MEDICINE

## 2022-11-15 PROCEDURE — 250N000013 HC RX MED GY IP 250 OP 250 PS 637: Performed by: INTERNAL MEDICINE

## 2022-11-15 PROCEDURE — 83735 ASSAY OF MAGNESIUM: CPT | Performed by: INTERNAL MEDICINE

## 2022-11-15 PROCEDURE — 999N000033 HC STATISTIC CHRONIC PULMONARY DISEASE SPECIALIST

## 2022-11-15 PROCEDURE — 120N000001 HC R&B MED SURG/OB

## 2022-11-15 PROCEDURE — 258N000003 HC RX IP 258 OP 636: Performed by: INTERNAL MEDICINE

## 2022-11-15 PROCEDURE — 80048 BASIC METABOLIC PNL TOTAL CA: CPT | Performed by: INTERNAL MEDICINE

## 2022-11-15 PROCEDURE — 80202 ASSAY OF VANCOMYCIN: CPT | Performed by: INTERNAL MEDICINE

## 2022-11-15 PROCEDURE — 99232 SBSQ HOSP IP/OBS MODERATE 35: CPT | Performed by: INTERNAL MEDICINE

## 2022-11-15 PROCEDURE — 86140 C-REACTIVE PROTEIN: CPT | Performed by: INTERNAL MEDICINE

## 2022-11-15 PROCEDURE — 99254 IP/OBS CNSLTJ NEW/EST MOD 60: CPT | Performed by: INTERNAL MEDICINE

## 2022-11-15 PROCEDURE — 85027 COMPLETE CBC AUTOMATED: CPT | Performed by: INTERNAL MEDICINE

## 2022-11-15 PROCEDURE — 36415 COLL VENOUS BLD VENIPUNCTURE: CPT | Performed by: INTERNAL MEDICINE

## 2022-11-15 RX ORDER — MAGNESIUM OXIDE 400 MG/1
400 TABLET ORAL EVERY 4 HOURS
Status: COMPLETED | OUTPATIENT
Start: 2022-11-15 | End: 2022-11-15

## 2022-11-15 RX ORDER — POTASSIUM CHLORIDE 1500 MG/1
20 TABLET, EXTENDED RELEASE ORAL ONCE
Status: COMPLETED | OUTPATIENT
Start: 2022-11-15 | End: 2022-11-15

## 2022-11-15 RX ADMIN — Medication 400 MG: at 08:41

## 2022-11-15 RX ADMIN — GUAIFENESIN 600 MG: 600 TABLET, EXTENDED RELEASE ORAL at 08:42

## 2022-11-15 RX ADMIN — VANCOMYCIN HYDROCHLORIDE 1500 MG: 5 INJECTION, POWDER, LYOPHILIZED, FOR SOLUTION INTRAVENOUS at 08:50

## 2022-11-15 RX ADMIN — NICOTINE 1 PATCH: 14 PATCH, EXTENDED RELEASE TRANSDERMAL at 08:45

## 2022-11-15 RX ADMIN — POTASSIUM CHLORIDE 20 MEQ: 1500 TABLET, EXTENDED RELEASE ORAL at 08:41

## 2022-11-15 RX ADMIN — NYSTATIN 500000 UNITS: 100000 SUSPENSION ORAL at 17:14

## 2022-11-15 RX ADMIN — PANTOPRAZOLE SODIUM 40 MG: 40 TABLET, DELAYED RELEASE ORAL at 08:42

## 2022-11-15 RX ADMIN — Medication 400 MG: at 11:53

## 2022-11-15 RX ADMIN — GUAIFENESIN 600 MG: 600 TABLET, EXTENDED RELEASE ORAL at 20:55

## 2022-11-15 RX ADMIN — NYSTATIN 500000 UNITS: 100000 SUSPENSION ORAL at 14:01

## 2022-11-15 RX ADMIN — NYSTATIN 500000 UNITS: 100000 SUSPENSION ORAL at 08:45

## 2022-11-15 RX ADMIN — CEFTRIAXONE SODIUM 2 G: 2 INJECTION, POWDER, FOR SOLUTION INTRAMUSCULAR; INTRAVENOUS at 14:02

## 2022-11-15 RX ADMIN — NAPROXEN 250 MG: 250 TABLET ORAL at 10:15

## 2022-11-15 RX ADMIN — NYSTATIN 500000 UNITS: 100000 SUSPENSION ORAL at 20:55

## 2022-11-15 RX ADMIN — VANCOMYCIN HYDROCHLORIDE 1500 MG: 5 INJECTION, POWDER, LYOPHILIZED, FOR SOLUTION INTRAVENOUS at 20:55

## 2022-11-15 ASSESSMENT — ACTIVITIES OF DAILY LIVING (ADL)
ADLS_ACUITY_SCORE: 18

## 2022-11-15 NOTE — PHARMACY-VANCOMYCIN DOSING SERVICE
Pharmacy Vancomycin Note  Date of Service November 15, 2022  Patient's  1995   27 year old, female    Indication: Skin and Soft Tissue Infection  Day of Therapy: 3  Current vancomycin regimen:  1000 mg IV q12h  Current vancomycin monitoring method: AUC  Current vancomycin therapeutic monitoring goal: 400-600 mg*h/L    [employer] Prediction of Current Vancomycin Regimen  Regimen: 1000 mg IV every 12 hours.  Start time: 08:35 on 11/15/2022  Exposure target: AUC24 (range)400-600 mg/L.hr   AUC24,ss: 332 mg/L.hr  Probability of AUC24 > 400: 17 %  Ctrough,ss: 7.2 mg/L  Probability of Ctrough,ss > 20: 0 %  Probability of nephrotoxicity (Lodise DENISE ): 4 %    Current estimated CrCl = Estimated Creatinine Clearance: 134.1 mL/min (based on SCr of 0.51 mg/dL).    Creatinine for last 3 days  2022:  5:54 AM Creatinine 0.59 mg/dL  2022:  5:36 AM Creatinine 0.52 mg/dL  11/15/2022:  5:14 AM Creatinine 0.51 mg/dL    Recent Vancomycin Levels (past 3 days)  11/15/2022:  5:14 AM Vancomycin 6.3 ug/mL    Vancomycin IV Administrations (past 72 hours)                   vancomycin (VANCOCIN) 1000 mg in dextrose 5% 200 mL PREMIX (mg) 1,000 mg New Bag 22 1814     1,000 mg New Bag  0645     1,000 mg New Bag 22 1752    vancomycin (VANCOCIN) 1000 mg in dextrose 5% 200 mL PREMIX (mg) 1,000 mg New Bag 22 0619                Nephrotoxins and other renal medications (From now, onward)    Start     Dose/Rate Route Frequency Ordered Stop    11/15/22 0800  vancomycin (VANCOCIN) 1,500 mg in sodium chloride 0.9 % 250 mL intermittent infusion         1,500 mg  over 90 Minutes Intravenous EVERY 12 HOURS 11/15/22 0736      22 1033  naproxen (NAPROSYN) tablet 250 mg        Note to Pharmacy: PTA Sig:Take 220 mg by mouth 2 times daily as needed      250 mg Oral 2 TIMES DAILY PRN 22 1033               Contrast Orders - past 72 hours (72h ago, onward)    None          Interpretation of levels and current  regimen:  Vancomycin level is reflective of AUC less than 400    Has serum creatinine changed greater than 50% in last 72 hours: No    Urine output:  good urine output    Renal Function: Stable    [employer] Prediction of Planned New Vancomycin Regimen  Regimen: 1500 mg IV every 12 hours.  Start time: 08:35 on 11/15/2022  Exposure target: AUC24 (range)400-600 mg/L.hr   AUC24,ss: 498 mg/L.hr  Probability of AUC24 > 400: 87 %  Ctrough,ss: 11.1 mg/L  Probability of Ctrough,ss > 20: 2 %  Probability of nephrotoxicity (Lodise DENISE 2009): 7 %      Plan:  1. Increase Dose to 1500 mg every 12 hours   2. Vancomycin monitoring method: AUC  3. Vancomycin therapeutic monitoring goal: 400-600 mg*h/L  4. Pharmacy will check vancomycin levels as appropriate in 1-3 Days.  5. Serum creatinine levels will be ordered daily for the first week of therapy and at least twice weekly for subsequent weeks.    Juan Webster, [facility]

## 2022-11-15 NOTE — CONSULTS
Consultation - Infectious Disease  [facility]  Oxana Harper,  1995, MRN [id number]    Admitting Dx: Septic bursitis of elbow, right [M71.121]    PCP: System, Provider Not In, None       ASSESSMENT   27-year-old woman without significant past medical history admitted with right infected olecranon bursitis    1. Infected right olecranon bursitis.  Presenting with fevers, erythema, swelling, pain.  Blood cultures negative x2.  No aspiration performed.  Responding to empiric antibiotics  2. Penicillin allergy.  Hives as an infant.  Rechallenged to amoxicillin as a teenager and had similar response.  Tolerating cephalosporins    Principal Problem:    Septic bursitis of elbow, right  Active Problems:    Sepsis (H)       PLAN   -No fluid palpable for aspiration  -Okay to discharge from ID perspective.  Would do Bactrim double strength twice daily to complete a 2-week course    Thank you for this consult. ID will sign-off, call with questions.    Deshawn Sage MD  [facility]  Direct messaging: [phone] Paging  On-Call ID provider: [phone], option: [phone]      ===========================================      Chief Complaint   Septic bursitis of elbow, right       HPI     We have been requested by Smith DEVLIN MD to evaluate Oxana Harper for the above.    History obtained by patient    Oxana Harper is a 27 year old woman without significant past medical history who presented with acute onset of right elbow pain.  She denies any recent trauma.  She initially presented to the ER at [facility] and was given NSAIDs.  Unfortunately when she went home she developed increased swelling and fevers.  On admission white count was elevated to 16.8.  Blood cultures were collected and are negative.  She has not been febrile since admission.  She has noted improvement with antibiotics.  Denies previous similar infection.          Review of Systems   Ten systems reviewed  "and negative except for what is noted in the HPI     Medical History  History reviewed. No pertinent past medical history. Surgical History  She  has no past surgical history on file.     Social History  Reviewed, and she  reports that she has been smoking cigarettes. She has never used smokeless tobacco.  Social History     Social History Narrative     Not on file     Family History    No recent family infections      Rare EtOH  +vaping  No ivdu  Denies HIV risk factors       Allergies     Allergies   Allergen Reactions     Codeine Hives     Penicillins Hives         Antibiotics   Ceftriaxone 11/14-  Vancomycin 11/13-    Previous:  Cefepime 11/13-11/14      Physical Exam     Temp:  [98  F (36.7  C)-98.5  F (36.9  C)] 98.4  F (36.9  C)  Pulse:  [] 87  Resp:  [15-18] 16  BP: ()/(51-65) 100/65  SpO2:  [98 %-100 %] 99 %    /65 (BP Location: Left arm, Patient Position: Semi-Palma's, Cuff Size: Adult Small)   Pulse 87   Temp 98.4  F (36.9  C) (Oral)   Resp 16   Ht 1.575 m (5' 2\")   Wt 52.2 kg (115 lb)   LMP 10/31/2022   SpO2 99%   BMI 21.03 kg/m      GENERAL:  well-developed, well-nourished, sitting in bed in no acute distress.   HENT:  Head is normocephalic, atraumatic.   EYES:  Eyes have anicteric sclerae without conjunctival injection or stigmata of endocarditis.   NECK:  Supple.  LUNGS:  Clear to auscultation.  CARDIOVASCULAR:  Regular rate and rhythm with no murmurs, gallops or rubs.  ABDOMEN:  Normal bowel sounds, soft, nontender. No appreciable hepatosplenomegaly  EXT: Right elbow with swelling and erythema, improved compared to pictures on her cell phone.  No fluctuant bursitis noted.  Excellent range of motion  SKIN:  No acute rashes. No stigmata of endocarditis.  NEUROLOGIC:  Grossly nonfocal.      Cultures   11/13 blood cultures x2: No growth to date      Laboratory results     Recent Labs   Lab 11/15/22  0514 11/14/22  0536 11/13/22  0554   WBC 11.6* 13.6* 16.8*   HGB 10.7* 11.4* " 12.5    147* 169       Recent Labs   Lab 11/15/22  0514 11/14/22  0536 11/13/22  0554    132* 135*   CO2 20* 19* 21*   BUN 3.3* 5.8* 5.7*       Recent Labs   Lab 11/15/22  0514 11/14/22  0536 11/13/22  0554   .70* 170.20* 49.70*           Imaging   Radiology results reviewed    XR Chest 2 Views    Result Date: 11/13/2022  EXAM: XR CHEST 2 VIEWS LOCATION: [facility] DATE/TIME: 11/13/2022, 8:26 AM INDICATION: Cough. COMPARISON: None available.     IMPRESSION: Two views of the chest were obtained. Cardiomediastinal silhouette is within normal limits. No suspicious focal pulmonary opacities. No significant pleural effusion or pneumothorax.     Elbow XR, G/E 3 views, right    Result Date: 11/12/2022  EXAM: XR ELBOW RIGHT G/E 3 VIEWS LOCATION: [facility] DATE/TIME: 11/12/2022 4:19 PM INDICATION: possible trauma to right elbow, pain COMPARISON: None.     IMPRESSION: Mild soft tissue swelling over the olecranon, cannot exclude bursitis or hematoma. No acute fracture or malalignment. There is normal joint spacing. No elbow joint effusion.      Data reviewed today: I reviewed all medications, new labs and imaging results over the last 24 hours. I personally reviewed the elbow xr image(s) showing no fracture.  The patient's care was discussed with the Patient.

## 2022-11-15 NOTE — CONSULTS
Tobacco Treatment Consult  11/15/2022, 11:38 AM    Patient admitted on: 11/13/2022   Patient admitted for: Septic bursitis of elbow, right [M71.121]   Patient seen on: 11/15/2022    Oxana declined tobacco treatment counseling and cessation resource materials at this time. She stated that she has no interest in quitting smoking at this time.    Total 1 minutes spent in smoking cessation, and 20 minutes spent in chart review, care coordination, and documentation.    Poppy Lobo, RT, Chronic Pulmonary Disease Specialist & Certified Tobacco Treatment Specialist  Phone [phone]

## 2022-11-15 NOTE — PROGRESS NOTES
[facility]    Medicine Progress Note - Hospitalist Service    Date of Admission:  11/13/2022    Assessment & Plan   27-year-old current smoker who presented to ED for evaluation of right elbow pain and swelling, thought to have right elbow cellulitis associated with olecranon bursitis and admitted for further management     Sepsis secondary to right elbow cellulitis and olecranon bursitis;  --Patient is clinically improving and WBC trending down.  -- On IV Rocephin and IV vancomycin.  ID consulted for antibiotic management given patient allergic to penicillin.  --Appreciated orthopedic input, recommended continue broad-spectrum IV antibiotics and no surgical intervention at this time.  WBAT RUE.  -- Continue pain management.    --WBC count trending down     Electrolyte imbalance;  -- Serum sodium improving.  --Hypokalemia, hypomagnesemia on protocol     Current smoker  -- Patient counseled to quit smoking  -- Currently on nicotine patch          Diet: Combination Diet Regular Diet Adult    DVT Prophylaxis: Pneumatic Compression Devices and Ambulate every shift  Zepeda Catheter: Not present  Central Lines: None  Cardiac Monitoring: None  Code Status: Full Code      Disposition Plan      Expected Discharge Date: 11/16/2022    Discharge Delays: IV Medication - consider oral or Home Infusion  Destination: home          The patient's care was discussed with the Bedside Nurse, Care Coordinator/ and Patient.    Smith DEVLIN MD  Hospitalist Service  [facility]  Securely message with the Vocera Web Console (learn more here)  Text page via [employer] [facility] Paging/Directory         Clinically Significant Risk Factors         # Hyponatremia: Lowest Na = 132 mmol/L (Ref range: 136-145) in last 2 days, will monitor as appropriate                       ______________________________________________________________________    Interval History   Patient is new to me.  Patient seen and  examined.  Notes, labs, imaging report personally reviewed.  Blood pressure on low normal range.  Patient reported right elbow pain and redness improving.  Denied feeling fever or chills.  Denied short of breath or chest pain or abdominal pain, nausea, vomiting.  Discussed with nursing staffs    Data reviewed today: I reviewed all medications, new labs and imaging results over the last 24 hours. I personally reviewed.    Physical Exam   Vital Signs: Temp: 98.4  F (36.9  C) Temp src: Oral BP: 100/65 Pulse: 87   Resp: 16 SpO2: 99 % O2 Device: None (Room air)    Weight: 115 lbs 0 oz    General: Not in obvious distress.  HEENT: Normocephalic, supple neck  Chest: Clear to auscultation bilateral anteriorly, no wheezing  Heart: S1S2 normal, regular  Abdomen: Soft. NT, ND. Bowel sounds- active.  Extremities: No legs swelling  Neuro: alert and awake, grossly non-focal  Musculoskeletal; still has right elbow area redness and tenderness and per nursing staff (was taking care of her yesterday) and patient better than yesterday      Data   Recent Labs   Lab 11/15/22  0514 11/14/22  0536 11/13/22 2014 11/13/22  1402 11/13/22  0554   WBC 11.6* 13.6*  --   --  16.8*   HGB 10.7* 11.4*  --   --  12.5    101*  --   --  100    147*  --   --  169    132*  --   --  135*   POTASSIUM 3.6 3.7 3.6   < > 3.0*   CHLORIDE 109* 105  --   --  101   CO2 20* 19*  --   --  21*   BUN 3.3* 5.8*  --   --  5.7*   CR 0.51 0.52  --   --  0.59   ANIONGAP 11 8  --   --  13   PRETTY 8.3* 8.5*  --   --  9.1   * 93  --   --  96    < > = values in this interval not displayed.

## 2022-11-15 NOTE — PROGRESS NOTES
"A&Ox4. Up ind, ad atif. PIV SL. IV abx infused per orders. Given PRN oxy, tylenol and atarax at bedtime for pain with good relief. Hourly rounding completed, continuing to monitor.    Blood pressure 105/61, pulse 104, temperature 98.2  F (36.8  C), temperature source Oral, resp. rate 16, height 1.575 m (5' 2\"), weight 52.2 kg (115 lb), last menstrual period 10/31/2022, SpO2 100 %.    Margarita Ingram RN  "

## 2022-11-15 NOTE — PROGRESS NOTES
"Orthopedic Progress Note      Assessment:    Right posterior elbow cellulitis versus small olecranon bursitis, improving    Plan:   - Continue broad-spectrum IV antibiotics  - No surgical intervention at this time  - .7, down trending  - WBC 11.6, down trending  - Pain Management per Medicine  - WBAT RUE  - Conservative treatment: Ice, compressed, rest.  - Recommend an MRI, if patient is experiencing increase pain or new pain with elbow ROM.  - Ortho will continue to follow patient      Subjective:  Pain: mild, improving  Neuro:  Patient denies new onset numbness or paresthesias    Patient reports right elbow pain gradually improving since starting IV antibiotics. Patient reports elbow was quite itching this morning and was given medication. Patient refuses to compress elbow with ace wrap as she had a poor experience during first ER visit as ace wrap was wrapped to tightly causing increase pain. Denies fever, chills.     Objective:  BP 90/51 (BP Location: Left arm)   Pulse 87   Temp 98.4  F (36.9  C) (Oral)   Resp 16   Ht 1.575 m (5' 2\")   Wt 52.2 kg (115 lb)   LMP 10/31/2022   SpO2 99%   BMI 21.03 kg/m    The patient is alert and awake. Patient is sitting up in bed and appears comfortable.   Improved Erythema over the posterior aspect of the elbow. Nontender along joint line. Patient tolerates active and passive elbow range of motion with pain at the end range of motion. Range of motion , limited by swelling and pain.  No fluctuance or evidence of abscess.  No evidence of large olecranon bursa.  Fires EPL, FPL, intrinsics.  Sensation intact in median, radial, ulnar nerve distribution.  Fingers warm and well-perfused. Radial pulse intact.     Pertinent Labs   Lab Results: personally reviewed.   No results found for: INR, PROTIME  Lab Results   Component Value Date    WBC 11.6 (H) 11/15/2022    HGB 10.7 (L) 11/15/2022    HCT 30.1 (L) 11/15/2022     11/15/2022     11/15/2022     Lab " Results   Component Value Date     11/15/2022    CO2 20 (L) 11/15/2022     CRP Inflammation   Date Value Ref Range Status   11/15/2022 131.70 (H) <5.00 mg/L Final         Phani Irby PA-C on 11/15/2022 at 10:24 AM

## 2022-11-16 VITALS
RESPIRATION RATE: 20 BRPM | DIASTOLIC BLOOD PRESSURE: 64 MMHG | TEMPERATURE: 97.9 F | HEART RATE: 100 BPM | WEIGHT: 115 LBS | BODY MASS INDEX: 21.16 KG/M2 | OXYGEN SATURATION: 100 % | HEIGHT: 62 IN | SYSTOLIC BLOOD PRESSURE: 129 MMHG

## 2022-11-16 PROBLEM — M70.21 OLECRANON BURSITIS, RIGHT ELBOW: Status: ACTIVE | Noted: 2022-11-13

## 2022-11-16 LAB
CREAT SERPL-MCNC: 0.5 MG/DL (ref 0.51–0.95)
CRP SERPL-MCNC: 66.9 MG/L
GFR SERPL CREATININE-BSD FRML MDRD: >90 ML/MIN/1.73M2
MAGNESIUM SERPL-MCNC: 1.8 MG/DL (ref 1.7–2.3)
POTASSIUM SERPL-SCNC: 3.3 MMOL/L (ref 3.4–5.3)
POTASSIUM SERPL-SCNC: 3.9 MMOL/L (ref 3.4–5.3)

## 2022-11-16 PROCEDURE — 250N000011 HC RX IP 250 OP 636: Performed by: INTERNAL MEDICINE

## 2022-11-16 PROCEDURE — 83735 ASSAY OF MAGNESIUM: CPT | Performed by: INTERNAL MEDICINE

## 2022-11-16 PROCEDURE — 258N000003 HC RX IP 258 OP 636: Performed by: INTERNAL MEDICINE

## 2022-11-16 PROCEDURE — 99239 HOSP IP/OBS DSCHRG MGMT >30: CPT | Performed by: INTERNAL MEDICINE

## 2022-11-16 PROCEDURE — 84132 ASSAY OF SERUM POTASSIUM: CPT | Performed by: INTERNAL MEDICINE

## 2022-11-16 PROCEDURE — 250N000013 HC RX MED GY IP 250 OP 250 PS 637: Performed by: INTERNAL MEDICINE

## 2022-11-16 PROCEDURE — 36415 COLL VENOUS BLD VENIPUNCTURE: CPT | Performed by: INTERNAL MEDICINE

## 2022-11-16 PROCEDURE — 86140 C-REACTIVE PROTEIN: CPT | Performed by: PHYSICIAN ASSISTANT

## 2022-11-16 PROCEDURE — 82565 ASSAY OF CREATININE: CPT | Performed by: INTERNAL MEDICINE

## 2022-11-16 RX ORDER — POTASSIUM CHLORIDE 20MEQ/15ML
40 LIQUID (ML) ORAL ONCE
Status: COMPLETED | OUTPATIENT
Start: 2022-11-16 | End: 2022-11-16

## 2022-11-16 RX ORDER — MAGNESIUM OXIDE 400 MG/1
400 TABLET ORAL EVERY 4 HOURS
Status: COMPLETED | OUTPATIENT
Start: 2022-11-16 | End: 2022-11-16

## 2022-11-16 RX ORDER — NYSTATIN 100000/ML
500000 SUSPENSION, ORAL (FINAL DOSE FORM) ORAL 4 TIMES DAILY
Qty: 80 ML | Refills: 0 | Status: SHIPPED | OUTPATIENT
Start: 2022-11-16 | End: 2022-11-20

## 2022-11-16 RX ORDER — NICOTINE 21 MG/24HR
1 PATCH, TRANSDERMAL 24 HOURS TRANSDERMAL DAILY
Qty: 14 PATCH | Refills: 0 | Status: SHIPPED | OUTPATIENT
Start: 2022-11-17

## 2022-11-16 RX ORDER — POTASSIUM CHLORIDE 1500 MG/1
40 TABLET, EXTENDED RELEASE ORAL ONCE
Status: COMPLETED | OUTPATIENT
Start: 2022-11-16 | End: 2022-11-16

## 2022-11-16 RX ORDER — SULFAMETHOXAZOLE/TRIMETHOPRIM 800-160 MG
1 TABLET ORAL 2 TIMES DAILY
Qty: 20 TABLET | Refills: 0 | Status: SHIPPED | OUTPATIENT
Start: 2022-11-16 | End: 2022-11-26

## 2022-11-16 RX ADMIN — NYSTATIN 500000 UNITS: 100000 SUSPENSION ORAL at 09:39

## 2022-11-16 RX ADMIN — GUAIFENESIN 600 MG: 600 TABLET, EXTENDED RELEASE ORAL at 09:39

## 2022-11-16 RX ADMIN — NYSTATIN 500000 UNITS: 100000 SUSPENSION ORAL at 13:34

## 2022-11-16 RX ADMIN — POTASSIUM CHLORIDE 40 MEQ: 20 SOLUTION ORAL at 10:05

## 2022-11-16 RX ADMIN — PANTOPRAZOLE SODIUM 40 MG: 40 TABLET, DELAYED RELEASE ORAL at 06:00

## 2022-11-16 RX ADMIN — CEFTRIAXONE SODIUM 2 G: 2 INJECTION, POWDER, FOR SOLUTION INTRAMUSCULAR; INTRAVENOUS at 13:37

## 2022-11-16 RX ADMIN — VANCOMYCIN HYDROCHLORIDE 1500 MG: 5 INJECTION, POWDER, LYOPHILIZED, FOR SOLUTION INTRAVENOUS at 09:43

## 2022-11-16 RX ADMIN — POTASSIUM CHLORIDE 40 MEQ: 1500 TABLET, EXTENDED RELEASE ORAL at 09:39

## 2022-11-16 RX ADMIN — Medication 400 MG: at 09:44

## 2022-11-16 RX ADMIN — NICOTINE 1 PATCH: 14 PATCH, EXTENDED RELEASE TRANSDERMAL at 09:44

## 2022-11-16 RX ADMIN — Medication 400 MG: at 13:34

## 2022-11-16 RX ADMIN — NAPROXEN 250 MG: 250 TABLET ORAL at 10:09

## 2022-11-16 ASSESSMENT — ACTIVITIES OF DAILY LIVING (ADL)
ADLS_ACUITY_SCORE: 18

## 2022-11-16 NOTE — DISCHARGE SUMMARY
[facility] MEDICINE  DISCHARGE SUMMARY     Primary Care Physician: System, Provider Not In  Admission Date: 11/13/2022   Discharge Provider: Smith DEVLIN MD Discharge Date: 11/16/2022   Diet:   Active Diet and Nourishment Order   Procedures     Combination Diet Regular Diet Adult     Diet       Code Status: Full Code   Activity: DCACTIVITY: Activity as tolerated        Condition at Discharge: Good     REASON FOR PRESENTATION(See Admission Note for Details)   Right elbow pain, fever.  Please refer to H&P for details    PRINCIPAL & ACTIVE DISCHARGE DIAGNOSES     Principal Problem:    Infection of olecranon bursitis, right elbow  Active Problems:    Sepsis (H)      PENDING LABS     Unresulted Labs Ordered in the Past 30 Days of this Admission     Date and Time Order Name Status Description    11/13/2022  5:42 AM Blood Culture Peripheral Blood Preliminary     11/13/2022  5:42 AM Blood Culture Line, venous Preliminary             PROCEDURES ( this hospitalization only)          RECOMMENDATIONS TO OUTPATIENT PROVIDER FOR F/U VISIT     Follow-up Appointments     Follow Up Care      Please follow-up with Dr. Malena Villagomez in 1 week at [facility].   Call our scheduling line at [phone] to make an appointment if you do   not already have one scheduled.         Follow-up and recommended labs and tests       Follow up with primary care provider, Provider Not In System, within 7   days for hospital follow- up.  The following labs/tests are recommended:   CBC, CMP, CRP.    Follow-up with orthopedic surgeon as recommended                 DISPOSITION     Home    SUMMARY OF HOSPITAL COURSE:      27-year-old current smoker who presented to ED for evaluation of right elbow pain and swelling, thought to have right elbow cellulitis associated with olecranon bursitis and admitted for further management     Infection of olecranon bursitis, right;  --Patient is clinically improving and WBC  trending down.  --  Patient received IV Rocephin and IV vancomycin during hospitalization then transition to Bactrim DS on discharge to complete 14 days course as recommended by ID.   -- Appreciated orthopedic input, recommended continue conservative management with antibiotics and follow-up with them in a week.  --Continue PTA pain medications     Electrolyte imbalance;  -- Hyponatremia, improved  --Hypokalemia replaced  --Recheck labs when follow-up with PCP in a week    Probable oral thrush;  -- Few days of oral nystatin.  Oral hygiene     Current smoker;  -- Patient counseled to quit smoking. Nicotine patch    Discharge Medications with Med changes:     Current Discharge Medication List      START taking these medications    Details   nicotine (NICODERM CQ) 14 MG/24HR 24 hr patch Place 1 patch onto the skin daily  Qty: 14 patch, Refills: 0    Associated Diagnoses: Tobacco use disorder      nystatin (MYCOSTATIN) 698148 UNIT/ML suspension Swish and swallow 5 mLs (500,000 Units) in mouth 4 times daily for 4 days  Qty: 80 mL, Refills: 0    Associated Diagnoses: Oral thrush      sulfamethoxazole-trimethoprim (BACTRIM DS) 800-160 MG tablet Take 1 tablet by mouth 2 times daily for 10 days  Qty: 20 tablet, Refills: 0    Associated Diagnoses: Bursitis due to bacterial infection         CONTINUE these medications which have NOT CHANGED    Details   acetaminophen (TYLENOL) 325 MG tablet Take 325-650 mg by mouth every 6 hours as needed      aspirin-acetaminophen-caffeine (EXCEDRIN MIGRAINE) 250-250-65 MG tablet Take 1 tablet by mouth daily as needed for headaches      calcium carbonate (TUMS) 500 MG chewable tablet Take 2 chew tab by mouth 4 times daily as needed for heartburn      dextromethorphan (TUSSIN COUGH) 15 MG/5ML syrup Take 10 mLs by mouth 4 times daily as needed for cough      naproxen sodium (ANAPROX) 220 MG tablet Take 220 mg by mouth 2 times daily as needed      omeprazole (PRILOSEC OTC) 20 MG EC tablet Take  40 mg by mouth daily                   Rationale for medication changes:      Please refer to hospital course        Consults       ORTHOPEDIC SURGERY IP CONSULT  PHARMACY TO DOSE VANCO  CARE MANAGEMENT / SOCIAL WORK IP CONSULT  INFECTIOUS DISEASES IP CONSULT  SMOKING CESSATION PROGRAM IP CONSULT    Immunizations given this encounter       There is no immunization history on file for this patient.        Anticoagulation Information        SIGNIFICANT IMAGING FINDINGS     Results for orders placed or performed during the hospital encounter of 11/13/22   XR Chest 2 Views    Impression    IMPRESSION: Two views of the chest were obtained. Cardiomediastinal silhouette is within normal limits. No suspicious focal pulmonary opacities. No significant pleural effusion or pneumothorax.         SIGNIFICANT LABORATORY FINDINGS     Most Recent 3 CBC's:Recent Labs   Lab Test 11/15/22  0514 11/14/22  0536 11/13/22  0554   WBC 11.6* 13.6* 16.8*   HGB 10.7* 11.4* 12.5    101* 100    147* 169     Most Recent 3 BMP's:Recent Labs   Lab Test 11/16/22  1504 11/16/22  0518 11/15/22  0514 11/14/22  0536 11/13/22  1402 11/13/22  0554   NA  --   --  140 132*  --  135*   POTASSIUM 3.9 3.3* 3.6 3.7   < > 3.0*   CHLORIDE  --   --  109* 105  --  101   CO2  --   --  20* 19*  --  21*   BUN  --   --  3.3* 5.8*  --  5.7*   CR  --  0.50* 0.51 0.52  --  0.59   ANIONGAP  --   --  11 8  --  13   PRETTY  --   --  8.3* 8.5*  --  9.1   GLC  --   --  109* 93  --  96    < > = values in this interval not displayed.       Discharge Orders        Follow Up Care    Please follow-up with Dr. Malena Villagomez in 1 week at [facility]. Call our scheduling line at [phone] to make an appointment if you do not already have one scheduled.     Reason for your hospital stay    Admitted for infection of right olecranon bursitis.     Follow-up and recommended labs and tests     Follow up with primary care provider, Provider Not In System, within 7 days  for hospital follow- up.  The following labs/tests are recommended: CBC, CMP, CRP.    Follow-up with orthopedic surgeon as recommended     Activity    Your activity upon discharge: activity as tolerated     Diet    Follow this diet upon discharge: Orders Placed This Encounter      Combination Diet Regular Diet Adult       Examination   Physical Exam   Temp:  [97.8  F (36.6  C)-98.2  F (36.8  C)] 97.8  F (36.6  C)  Pulse:  [] 91  Resp:  [19-20] 20  BP: (103-107)/(56-61) 103/56  SpO2:  [98 %-99 %] 98 %  Wt Readings from Last 1 Encounters:   11/13/22 52.2 kg (115 lb)       Patient seen and examined.  Notes, labs, imaging report personally reviewed.  Patient reported right elbow pain, swelling, redness continued to improve.  Denied fever or chills.  Denied other new concerns or complaints.  Discussed with nursing staffs.  Discussed with orthopedic team.        General: Not in obvious distress.  HEENT: Normocephalic, supple neck  Chest: Clear to auscultation bilateral anteriorly, no wheezing  Heart: S1S2 normal, regular  Abdomen: Soft. NT, ND. Bowel sounds- active.  Extremities: No legs swelling  Musculoskeletal; erythema, swelling, tenderness on posterior aspect of right elbow continue to improve.  Neuro: alert and awake, grossly non-focal    Please see EMR for more detailed significant labs, imaging, consultant notes etc.    Smith ANGLIN MD, personally saw the patient today and spent greater than 30 minutes discharging this patient.    Smith DEVLIN MD  [facility]    CC:System, Provider Not In

## 2022-11-16 NOTE — PROGRESS NOTES
Patient seen and examined.  Patient reported right elbow pain and swelling continue to improve.  No other joint pains.      No fever or chills.  No respiratory/GI/ symptoms    Appreciated ID input, cleared for discharge on Bactrim DS  to complete 2 weeks course of antibiotics.    Awaiting orthopedic input regarding discharge clearance and follow-up with them after discharge.    Discussed with patient and patient nurse.    Full note to follow

## 2022-11-16 NOTE — PROGRESS NOTES
"Orthopedic Progress Note      Assessment:    Right posterior elbow cellulitis versus small olecranon bursitis, improving    Plan:   - Okay to transition from IV to oral antibiotics as patient symptom continues to improve.  - No surgical intervention at this time  - CRP 66.90, down tredning   - Pain Management per Medicine  - WBAT RUE  - Conservative treatment: Ice, compressed, rest.  - Recommend an MRI, if patient is experiencing increase pain or new pain with elbow ROM.  - Ortho will sign off. Please feel free to reach out with any further questions or concerns.   - Recommend outpatient follow up with Dr. Villagomez at [facility] in 1 week.      Subjective:  Pain: minimum, improving  Neuro:  Patient denies new onset numbness or paresthesias    Patient reports right elbow pain gradually improving since starting IV antibiotics. Patient reports redness improving however there is still some warmth. Patient reports elbow feeling more stiff as she slept on her elbow over night. All questions/concerns answered. Denies fever, chills.     Objective:  /56 (BP Location: Left arm)   Pulse 91   Temp 97.8  F (36.6  C) (Oral)   Resp 20   Ht 1.575 m (5' 2\")   Wt 52.2 kg (115 lb)   LMP 10/31/2022   SpO2 98%   BMI 21.03 kg/m    The patient is alert and awake. Patient is sitting up in bed and appears comfortable.   Improved Erythema over the posterior aspect of the elbow. Nontender along joint line. Patient tolerates active and passive elbow range of motion with pain at the end range of motion. Range of motion , limited by swelling and pain.  No fluctuance or evidence of abscess.  No evidence of large olecranon bursa.  Fires EPL, FPL, intrinsics.  Sensation intact in median, radial, ulnar nerve distribution.  Fingers warm and well-perfused. Radial pulse intact.     Pertinent Labs   Lab Results: personally reviewed.   No results found for: INR, PROTIME  Lab Results   Component Value Date    WBC 11.6 (H) " 11/15/2022    HGB 10.7 (L) 11/15/2022    HCT 30.1 (L) 11/15/2022     11/15/2022     11/15/2022     Lab Results   Component Value Date     11/15/2022    CO2 20 (L) 11/15/2022     CRP Inflammation   Date Value Ref Range Status   11/15/2022 131.70 (H) <5.00 mg/L Final         Phani Irby PA-C on 11/16/2022 at 10:40 AM

## 2022-11-17 ENCOUNTER — PATIENT OUTREACH (OUTPATIENT)
Dept: CARE COORDINATION | Facility: CLINIC | Age: 27
End: 2022-11-17

## 2022-11-18 LAB
BACTERIA BLD CULT: NO GROWTH
BACTERIA BLD CULT: NO GROWTH
